# Patient Record
Sex: FEMALE | Race: ASIAN | Employment: FULL TIME | URBAN - NONMETROPOLITAN AREA
[De-identification: names, ages, dates, MRNs, and addresses within clinical notes are randomized per-mention and may not be internally consistent; named-entity substitution may affect disease eponyms.]

---

## 2024-04-10 ENCOUNTER — OFFICE VISIT (OUTPATIENT)
Dept: URGENT CARE | Facility: CLINIC | Age: 29
End: 2024-04-10
Payer: COMMERCIAL

## 2024-04-10 VITALS
SYSTOLIC BLOOD PRESSURE: 107 MMHG | OXYGEN SATURATION: 100 % | TEMPERATURE: 98.4 F | RESPIRATION RATE: 18 BRPM | DIASTOLIC BLOOD PRESSURE: 73 MMHG | HEART RATE: 78 BPM

## 2024-04-10 DIAGNOSIS — J06.9 VIRAL URI WITH COUGH: Primary | ICD-10-CM

## 2024-04-10 PROCEDURE — 99203 OFFICE O/P NEW LOW 30 MIN: CPT | Performed by: STUDENT IN AN ORGANIZED HEALTH CARE EDUCATION/TRAINING PROGRAM

## 2024-04-10 RX ORDER — NORETHINDRONE ACETATE AND ETHINYL ESTRADIOL AND FERROUS FUMARATE 1MG-20(21)
KIT ORAL
COMMUNITY

## 2024-04-10 RX ORDER — FLUTICASONE PROPIONATE 50 MCG
1 SPRAY, SUSPENSION (ML) NASAL 2 TIMES DAILY
Qty: 16 G | Refills: 0 | Status: SHIPPED | OUTPATIENT
Start: 2024-04-10

## 2024-04-10 NOTE — LETTER
April 10, 2024     Patient: Hayley Brown   YOB: 1995   Date of Visit: 4/10/2024       To Whom it May Concern:    Hayley Brown was seen in my clinic on 4/10/2024.     If you have any questions or concerns, please don't hesitate to call.         Sincerely,          Alexia Galo, DO        CC: No Recipients

## 2024-04-10 NOTE — PROGRESS NOTES
[facility]        NAME: Hayley Brown is a 29 y.o. female  : 1995    MRN: [id number]    Assessment and Plan   Viral URI with cough [J06.9]  1. Viral URI with cough  fluticasone (FLONASE) 50 mcg/act nasal spray        Low suspicion for bacterial etiology of presenting symptoms.  Discussed symptomatic and supportive measures for management.  Provided work note.    Patient Instructions     See wrap up for details  Follow up with PCP in 3-5 days.  Proceed to  ER if symptoms worsen.    Chief Complaint     Chief Complaint   Patient presents with    Cough    Sore Throat     Sore throat with cough onset 2 days ago  denies fever or chills         History of Present Illness     HPI    Onset of symptoms 2 days ago  Reports sore throat, nonproductive cough, mild pain with swallowing, nausea, myalgias  Denies fever, chills, congestion, rhinorrhea, diarrhea, vomiting, fatigue, decreased appetite   Has not tried any otc     Review of Systems   Review of Systems   Constitutional:  Negative for chills and fever.   HENT:  Positive for sore throat and trouble swallowing. Negative for ear pain.    Eyes:  Negative for pain and visual disturbance.   Respiratory:  Positive for cough. Negative for chest tightness and shortness of breath.    Cardiovascular:  Negative for chest pain and palpitations.   Gastrointestinal:  Positive for nausea. Negative for abdominal pain, constipation, diarrhea and vomiting.   Genitourinary:  Negative for dysuria, hematuria and menstrual problem.   Musculoskeletal:  Positive for myalgias. Negative for arthralgias and back pain.   Skin:  Negative for color change and rash.   Neurological:  Negative for seizures and syncope.   Psychiatric/Behavioral:  Negative for dysphoric mood and suicidal ideas.    All other systems reviewed and are negative.    Current Medications       Current Outpatient Medications:     fluticasone (FLONASE) 50 mcg/act nasal spray, 1 spray into each nostril 2 (two)  times a day, Disp: 16 g, Rfl: 0    Aurovela FE 1/20 1-20 MG-MCG per tablet, TOME 1 TABLETA POR V A ORAL TODOS LOS D AS, Disp: , Rfl:     Current Allergies     Allergies as of 04/10/2024    (No Known Allergies)            The following portions of the patient's history were reviewed and updated as appropriate: allergies, current medications, past family history, past medical history, past social history, past surgical history and problem list.     History reviewed. No pertinent past medical history.    History reviewed. No pertinent surgical history.    No family history on file.      Medications have been verified.        Objective   /73   Pulse 78   Temp 98.4 °F (36.9 °C)   Resp 18   SpO2 100%        Physical Exam     Physical Exam  Constitutional:       General: She is not in acute distress.     Appearance: Normal appearance.   HENT:      Head: Normocephalic and atraumatic.      Right Ear: A middle ear effusion is present. Tympanic membrane is not erythematous.      Left Ear: A middle ear effusion is present. Tympanic membrane is not erythematous.      Nose: Congestion present.      Mouth/Throat:      Mouth: Mucous membranes are moist.      Pharynx: Oropharynx is clear. No oropharyngeal exudate or posterior oropharyngeal erythema.      Tonsils: No tonsillar exudate or tonsillar abscesses. 1+ on the right. 1+ on the left.   Eyes:      Extraocular Movements: Extraocular movements intact.      Conjunctiva/sclera: Conjunctivae normal.   Cardiovascular:      Rate and Rhythm: Normal rate and regular rhythm.   Pulmonary:      Effort: Pulmonary effort is normal. No respiratory distress.      Breath sounds: Normal breath sounds.   Lymphadenopathy:      Cervical: No cervical adenopathy.   Skin:     General: Skin is warm and dry.   Neurological:      General: No focal deficit present.      Mental Status: She is alert and oriented to person, place, and time.   Psychiatric:         Mood and Affect: Mood normal.          Behavior: Behavior normal.

## 2024-07-27 ENCOUNTER — HOSPITAL ENCOUNTER (EMERGENCY)
Facility: HOSPITAL | Age: 29
Discharge: HOME | End: 2024-07-27
Attending: EMERGENCY MEDICINE
Payer: COMMERCIAL

## 2024-07-27 ENCOUNTER — APPOINTMENT (EMERGENCY)
Dept: RADIOLOGY | Facility: HOSPITAL | Age: 29
End: 2024-07-27
Payer: COMMERCIAL

## 2024-07-27 VITALS
HEIGHT: 62 IN | HEART RATE: 69 BPM | TEMPERATURE: 98.9 F | WEIGHT: 118 LBS | DIASTOLIC BLOOD PRESSURE: 77 MMHG | OXYGEN SATURATION: 99 % | RESPIRATION RATE: 18 BRPM | BODY MASS INDEX: 21.71 KG/M2 | SYSTOLIC BLOOD PRESSURE: 117 MMHG

## 2024-07-27 DIAGNOSIS — S93.401A MODERATE ANKLE SPRAIN, RIGHT, INITIAL ENCOUNTER: Primary | ICD-10-CM

## 2024-07-27 PROCEDURE — 73610 X-RAY EXAM OF ANKLE: CPT

## 2024-07-27 PROCEDURE — 99284 EMERGENCY DEPT VISIT MOD MDM: CPT | Performed by: EMERGENCY MEDICINE

## 2024-07-27 PROCEDURE — 73630 X-RAY EXAM OF FOOT: CPT

## 2024-07-27 PROCEDURE — 99283 EMERGENCY DEPT VISIT LOW MDM: CPT

## 2024-07-27 RX ORDER — IBUPROFEN 400 MG/1
400 TABLET ORAL ONCE
Status: COMPLETED | OUTPATIENT
Start: 2024-07-27 | End: 2024-07-27

## 2024-07-27 RX ADMIN — IBUPROFEN 400 MG: 400 TABLET ORAL at 07:42

## 2024-07-27 NOTE — Clinical Note
Hayley Brown was seen and treated in our emergency department on 7/27/2024.                Diagnosis:     Hayley  may return to school on return date.    She may return on this date: 07/31/2024    Ms. Brown was seen in the [facility] emergency department on 27 July 2024.  Due to injury, she should abstain from work on 30 July but can return on 31 July.  Thank you.     If you have any questions or concerns, please don't hesitate to call.      Jonathan Burnham, DO    ______________________________           _______________          _______________  Hospital Representative                              Date                                Time

## 2024-07-27 NOTE — ED PROVIDER NOTES
History  Chief Complaint   Patient presents with    Ankle Injury     Patient twisted her R ankle yesterday.      29-year-old woman presents to the emergency department with injury to the right ankle occurring yesterday evening when she inverted it while descending a flight of stairs.  States that she was not looking exactly where she was stepping, causing her to step awkwardly and invert the right ankle.  Had immediate pain along the medial/lateral aspects of the right ankle that is now worse with active range of motion and weightbearing.  No pain more proximally along the leg or at the knee.  Also notes some pain through the midportion of the foot which is particularly worse with weightbearing. Indeed she has not been able to perform more than minimal weightbearing since the injury secondary to severe pain.  No other trauma or injury from the episode.  No prior fracture or surgery in right lower extremity.  Did not take or use anything for pain this morning.    A/P: Unable to fully weight-bear since the injury with reproducible medial/lateral ankle tenderness.  Imaging of the ankle indicated per [place] ankle rules.  Concurrent tenderness through the foot also warrants imaging of foot given inability to fully evaluate with lack of complete weightbearing.  No evidence of neurovascular injury.  Symptomatic management while workup ongoing.      History provided by:  Medical records, patient and relative   used: No (Patient's relative provides interpretation for her)    Ankle Injury      Prior to Admission Medications   Prescriptions Last Dose Informant Patient Reported? Taking?   Aurovela FE  1-20 MG-MCG per tablet   Yes No   Sig: TOME 1 TABLETA POR V A ORAL TODOS LOS D AS   fluticasone (FLONASE) 50 mcg/act nasal spray   No No   Si spray into each nostril 2 (two) times a day      Facility-Administered Medications: None       History reviewed. No pertinent past medical history.    History  reviewed. No pertinent surgical history.    History reviewed. No pertinent family history.  I have reviewed and agree with the history as documented.    E-Cigarette/Vaping     E-Cigarette/Vaping Substances     Social History     Tobacco Use    Smoking status: Never    Smokeless tobacco: Never       Review of Systems   Musculoskeletal:  Positive for arthralgias, gait problem (Unable to bear weight on RLE 2/2 pain) and joint swelling.   Skin: Negative.  Negative for color change and wound.   Neurological:  Negative for weakness and numbness.       Physical Exam  Physical Exam  Vitals and nursing note reviewed.   Constitutional:       General: She is not in acute distress.     Appearance: Normal appearance. She is not ill-appearing.   HENT:      Head: Normocephalic and atraumatic.   Neck:      Trachea: Trachea and phonation normal.   Cardiovascular:      Rate and Rhythm: Normal rate and regular rhythm.      Pulses: Normal pulses.           Popliteal pulses are 2+ on the right side and 2+ on the left side.        Dorsalis pedis pulses are 2+ on the right side and 2+ on the left side.        Posterior tibial pulses are 2+ on the right side and 2+ on the left side.   Pulmonary:      Effort: Pulmonary effort is normal. No tachypnea, accessory muscle usage or respiratory distress.   Musculoskeletal:      Right knee: Normal.      Left knee: Normal.      Right lower leg: Normal.      Left lower leg: Normal.      Right ankle: Swelling (mild swelling over lateral ankle) present. Tenderness present over the lateral malleolus, medial malleolus and ATF ligament. No posterior TF ligament, base of 5th metatarsal or proximal fibula tenderness. Decreased range of motion.      Left ankle: Normal.      Right foot: Normal range of motion. Tenderness (tenderness over mid-metatarsals; no tenderness of Lisfranc complex) and bony tenderness present. No swelling, deformity or crepitus.      Left foot: Normal.      Comments: RLE: Mild soft  tissue swelling immediately inferior/anterior to the lateral malleolus with no palpable bony deformity and no overlying skin breakdown.  TTP over bilateral malleoli and regions immediately inferior/anterior of both.  Remainder of tibia and fibular nontender including fibular head.  No ttp of proximal 5th MT.  Full AROM in flexion/extension/inversion/eversion at ankle.  Strength 5/5 in LE bilaterally at knee/ankle/toes in flexion/extension.       Skin:     General: Skin is warm and dry.      Capillary Refill: Capillary refill takes less than 2 seconds. <2s in toes of R foot  Neurological:      Mental Status: She is alert and oriented to person, place, and time.      GCS: GCS eye subscore is 4. GCS verbal subscore is 5. GCS motor subscore is 6.      Sensory: Sensation is intact.      Motor: Motor function is intact.      Comments: Intact sensation to light touch in bilateral lower extremity distal to the knee.  Strength 5/5 bilateral lower extremity at knee and ankle in all planes of motion able to wiggle toes of right foot..         Vital Signs  ED Triage Vitals [07/27/24 0730]   Temperature Pulse Respirations Blood Pressure SpO2   98.9 °F (37.2 °C) 69 18 117/77 99 %      Temp Source Heart Rate Source Patient Position - Orthostatic VS BP Location FiO2 (%)   Temporal Monitor Sitting Left arm --      Pain Score       6           Vitals:    07/27/24 0730   BP: 117/77   Pulse: 69   Patient Position - Orthostatic VS: Sitting         Visual Acuity      ED Medications  Medications   ibuprofen (MOTRIN) tablet 400 mg (400 mg Oral Given 7/27/24 0742)       Diagnostic Studies  Results Reviewed       None                   XR foot 3+ views RIGHT   ED Interpretation by Jonathan Burnham DO (07/27 0757)   No fracture or dislocation.  Joint spaces appear normal.      XR ankle 3+ views RIGHT   ED Interpretation by Jonathan Burnham DO (07/27 0757)   No fracture or dislocation.  Joint spaces appear normal.                  Procedures  Procedures         ED Course  ED Course as of 07/27/24 0822   Sat Jul 27, 2024   0757 No evidence of fracture or dislocation on plain film images.  As noted previously, no neurovascular impairment.  No gross ligamentous instability on examination.  Will apply immobilizing boot and give crutches with weightbearing as tolerated.  Acetaminophen/ibuprofen as needed for pain control. Would need reexamination by primary physician or at least other medical professional in the near future (about 2 wk) to assure appropriate healing given that she is not able to bear weight at present  All questions answered to satisfaction of patient and her family prior to discharge.  They expressed understanding and agreed to plan           SBIRT 22yo+      Flowsheet Row Most Recent Value   Initial Alcohol Screen: US AUDIT-C     1. How often do you have a drink containing alcohol? 0 Filed at: 07/27/2024 0730   2. How many drinks containing alcohol do you have on a typical day you are drinking?  0 Filed at: 07/27/2024 0730   3a. Male UNDER 65: How often do you have five or more drinks on one occasion? 0 Filed at: 07/27/2024 0730   3b. FEMALE Any Age, or MALE 65+: How often do you have 4 or more drinks on one occassion? 0 Filed at: 07/27/2024 0730   Audit-C Score 0 Filed at: 07/27/2024 0730   ALEX: How many times in the past year have you...    Used an illegal drug or used a prescription medication for non-medical reasons? Never Filed at: 07/27/2024 0730            Medical Decision Making  Amount and/or Complexity of Data Reviewed  Radiology: ordered and independent interpretation performed.    Risk  Prescription drug management.                 Disposition  Final diagnoses:   Moderate ankle sprain, right, initial encounter     Time reflects when diagnosis was documented in both MDM as applicable and the Disposition within this note       Time User Action Codes Description Comment    7/27/2024  7:59 AM Jonathan Burnham  Add [S93.401A] Moderate ankle sprain, right, initial encounter           ED Disposition       ED Disposition   Discharge    Condition   Stable    Date/Time   Sat Jul 27, 2024 0819    Comment   Hayley Brown discharge to home/self care.                   Follow-up Information    None         Patient's Medications   Discharge Prescriptions    No medications on file       No discharge procedures on file.    PDMP Review       None            ED Provider  Electronically Signed by             Jonathan Burnham DO  07/27/24 0905

## 2024-07-27 NOTE — DISCHARGE INSTRUCTIONS
You can wear the boot whenever you are up and moving around. You can put as much weight on your ankle as you can tolerate.    You can use the crutches as needed for the next week.    Continue to use ibuprofen and/or acetaminophen as needed for pain.    You should make an appointment with your regular doctor in about 2 weeks to have your ankle rechecked.    Puede usar la bota siempre que esté de pie y en movimiento. Puede poner todo el peso que pueda tolerar sobre el tobillo.    Puede usar las muletas según sea necesario joshua la próxima semana.    Continúe usando ibuprofeno o acetaminofeno según sea necesario para el dolor.    Debe programar luther beckie con ryan médico habitual en aproximadamente 2 semanas para que le vuelvan a controlar el tobillo.

## 2025-02-16 ENCOUNTER — HOSPITAL ENCOUNTER (EMERGENCY)
Facility: HOSPITAL | Age: 30
Discharge: HOME | End: 2025-02-16
Attending: EMERGENCY MEDICINE | Admitting: EMERGENCY MEDICINE
Payer: COMMERCIAL

## 2025-02-16 VITALS
SYSTOLIC BLOOD PRESSURE: 98 MMHG | DIASTOLIC BLOOD PRESSURE: 55 MMHG | RESPIRATION RATE: 16 BRPM | OXYGEN SATURATION: 97 % | HEART RATE: 78 BPM | TEMPERATURE: 98.7 F

## 2025-02-16 DIAGNOSIS — R11.2 NAUSEA AND VOMITING: Primary | ICD-10-CM

## 2025-02-16 DIAGNOSIS — Z34.90 PREGNANCY: ICD-10-CM

## 2025-02-16 LAB
ALBUMIN SERPL BCG-MCNC: 3.8 G/DL (ref 3.5–5)
ALP SERPL-CCNC: 30 U/L (ref 34–104)
ALT SERPL W P-5'-P-CCNC: 9 U/L (ref 7–52)
ANION GAP SERPL CALCULATED.3IONS-SCNC: 8 MMOL/L (ref 4–13)
AST SERPL W P-5'-P-CCNC: 19 U/L (ref 13–39)
BACTERIA UR QL AUTO: ABNORMAL /HPF
BASOPHILS # BLD AUTO: 0.04 THOUSANDS/ΜL (ref 0–0.1)
BASOPHILS NFR BLD AUTO: 0 % (ref 0–1)
BILIRUB SERPL-MCNC: 0.43 MG/DL (ref 0.2–1)
BILIRUB UR QL STRIP: NEGATIVE
BUN SERPL-MCNC: 6 MG/DL (ref 5–25)
CALCIUM SERPL-MCNC: 8.6 MG/DL (ref 8.4–10.2)
CHLORIDE SERPL-SCNC: 108 MMOL/L (ref 96–108)
CLARITY UR: CLEAR
CO2 SERPL-SCNC: 20 MMOL/L (ref 21–32)
COLOR UR: YELLOW
CREAT SERPL-MCNC: 0.46 MG/DL (ref 0.6–1.3)
EOSINOPHIL # BLD AUTO: 0.77 THOUSAND/ΜL (ref 0–0.61)
EOSINOPHIL NFR BLD AUTO: 8 % (ref 0–6)
ERYTHROCYTE [DISTWIDTH] IN BLOOD BY AUTOMATED COUNT: 12.1 % (ref 11.6–15.1)
GFR SERPL CREATININE-BSD FRML MDRD: 134 ML/MIN/1.73SQ M
GLUCOSE SERPL-MCNC: 114 MG/DL (ref 65–140)
GLUCOSE UR STRIP-MCNC: NEGATIVE MG/DL
HCT VFR BLD AUTO: 42.5 % (ref 34.8–46.1)
HGB BLD-MCNC: 13.5 G/DL (ref 11.5–15.4)
HGB UR QL STRIP.AUTO: ABNORMAL
IMM GRANULOCYTES # BLD AUTO: 0.03 THOUSAND/UL (ref 0–0.2)
IMM GRANULOCYTES NFR BLD AUTO: 0 % (ref 0–2)
KETONES UR STRIP-MCNC: ABNORMAL MG/DL
LEUKOCYTE ESTERASE UR QL STRIP: NEGATIVE
LIPASE SERPL-CCNC: 39 U/L (ref 11–82)
LYMPHOCYTES # BLD AUTO: 2.8 THOUSANDS/ΜL (ref 0.6–4.47)
LYMPHOCYTES NFR BLD AUTO: 29 % (ref 14–44)
MCH RBC QN AUTO: 30.2 PG (ref 26.8–34.3)
MCHC RBC AUTO-ENTMCNC: 31.8 G/DL (ref 31.4–37.4)
MCV RBC AUTO: 95 FL (ref 82–98)
MONOCYTES # BLD AUTO: 0.63 THOUSAND/ΜL (ref 0.17–1.22)
MONOCYTES NFR BLD AUTO: 7 % (ref 4–12)
MUCOUS THREADS UR QL AUTO: ABNORMAL
NEUTROPHILS # BLD AUTO: 5.28 THOUSANDS/ΜL (ref 1.85–7.62)
NEUTS SEG NFR BLD AUTO: 56 % (ref 43–75)
NITRITE UR QL STRIP: NEGATIVE
NON-SQ EPI CELLS URNS QL MICRO: ABNORMAL /HPF
NRBC BLD AUTO-RTO: 0 /100 WBCS
PH UR STRIP.AUTO: 6 [PH]
PLATELET # BLD AUTO: 205 THOUSANDS/UL (ref 149–390)
PMV BLD AUTO: 10.8 FL (ref 8.9–12.7)
POTASSIUM SERPL-SCNC: 4 MMOL/L (ref 3.5–5.3)
PROT SERPL-MCNC: 6.6 G/DL (ref 6.4–8.4)
PROT UR STRIP-MCNC: ABNORMAL MG/DL
RBC # BLD AUTO: 4.47 MILLION/UL (ref 3.81–5.12)
RBC #/AREA URNS AUTO: ABNORMAL /HPF
SODIUM SERPL-SCNC: 136 MMOL/L (ref 135–147)
SP GR UR STRIP.AUTO: >=1.03
UROBILINOGEN UR QL STRIP.AUTO: 0.2 E.U./DL
WBC # BLD AUTO: 9.55 THOUSAND/UL (ref 4.31–10.16)
WBC #/AREA URNS AUTO: ABNORMAL /HPF

## 2025-02-16 PROCEDURE — 85025 COMPLETE CBC W/AUTO DIFF WBC: CPT

## 2025-02-16 PROCEDURE — 87086 URINE CULTURE/COLONY COUNT: CPT

## 2025-02-16 PROCEDURE — 96365 THER/PROPH/DIAG IV INF INIT: CPT

## 2025-02-16 PROCEDURE — 96375 TX/PRO/DX INJ NEW DRUG ADDON: CPT

## 2025-02-16 PROCEDURE — 80053 COMPREHEN METABOLIC PANEL: CPT

## 2025-02-16 PROCEDURE — 96366 THER/PROPH/DIAG IV INF ADDON: CPT

## 2025-02-16 PROCEDURE — 81001 URINALYSIS AUTO W/SCOPE: CPT

## 2025-02-16 PROCEDURE — 99284 EMERGENCY DEPT VISIT MOD MDM: CPT | Performed by: EMERGENCY MEDICINE

## 2025-02-16 PROCEDURE — 99283 EMERGENCY DEPT VISIT LOW MDM: CPT

## 2025-02-16 PROCEDURE — 36415 COLL VENOUS BLD VENIPUNCTURE: CPT

## 2025-02-16 PROCEDURE — 83690 ASSAY OF LIPASE: CPT

## 2025-02-16 PROCEDURE — 76815 OB US LIMITED FETUS(S): CPT | Performed by: EMERGENCY MEDICINE

## 2025-02-16 RX ORDER — METOCLOPRAMIDE HYDROCHLORIDE 5 MG/ML
10 INJECTION INTRAMUSCULAR; INTRAVENOUS ONCE
Status: COMPLETED | OUTPATIENT
Start: 2025-02-16 | End: 2025-02-16

## 2025-02-16 RX ORDER — PYRIDOXINE HYDROCHLORIDE 100 MG/ML
50 INJECTION, SOLUTION INTRAMUSCULAR; INTRAVENOUS ONCE
Status: DISCONTINUED | OUTPATIENT
Start: 2025-02-16 | End: 2025-02-16

## 2025-02-16 RX ORDER — ONDANSETRON 4 MG/1
4 TABLET, FILM COATED ORAL EVERY 6 HOURS
Qty: 12 TABLET | Refills: 0 | Status: SHIPPED | OUTPATIENT
Start: 2025-02-16

## 2025-02-16 RX ORDER — PYRIDOXINE HYDROCHLORIDE 100 MG/ML
25 INJECTION, SOLUTION INTRAMUSCULAR; INTRAVENOUS ONCE
Status: COMPLETED | OUTPATIENT
Start: 2025-02-16 | End: 2025-02-16

## 2025-02-16 RX ADMIN — DEXTROSE AND SODIUM CHLORIDE 1000 ML: 5; .9 INJECTION, SOLUTION INTRAVENOUS at 18:17

## 2025-02-16 RX ADMIN — METOCLOPRAMIDE 10 MG: 5 INJECTION, SOLUTION INTRAMUSCULAR; INTRAVENOUS at 18:19

## 2025-02-16 RX ADMIN — PYRIDOXINE HYDROCHLORIDE 25 MG: 100 INJECTION, SOLUTION INTRAMUSCULAR; INTRAVENOUS at 19:19

## 2025-02-16 NOTE — ED PROVIDER NOTES
Time reflects when diagnosis was documented in both MDM as applicable and the Disposition within this note       Time User Action Codes Description Comment    2/16/2025  7:39 PM Sony Barrientos [R11.2] Nausea and vomiting     2/16/2025  7:39 PM Sony Barrientos [Z34.90] Pregnancy           ED Disposition       ED Disposition   Discharge    Condition   Stable    Date/Time   Sun Feb 16, 2025  8:03 PM    Comment   Hayley Brown discharge to home/self care.                   Assessment & Plan       Medical Decision Making      DDx: Rule out hyperemesis gravidarum, gastroenteritis, bowel disease, pancreatitis, low concern for peptic ulcer disease    Plan: CMP CBC LIPASE UA POC US IVF ANTIEMETICS     See ED course for further discussion    Labs are overall concerning for mild ketosis.  No weight loss.  Symptoms for overall well-controlled in the emergency department with patient tolerating p.o.  Received D5 NS.  Fetal heart tones within normal limits.  Stable for discharge home.  Provided Zofran follow-up with OB/GYN.    Disposition: Discharged with instructions to obtain outpatient follow up of patient's symptoms and findings, with strict return precautions if patient develops new or worsening symptoms. Patient understands this plan and is agreeable. All questions answered. Patient discharged home with return precautions.          Amount and/or Complexity of Data Reviewed  Labs: ordered. Decision-making details documented in ED Course.    Risk  Prescription drug management.        ED Course as of 02/16/25 2018   Sun Feb 16, 2025   1727 Fetal US 2/4/25: A normal gestational sac was documented. A normal fetal pole was   visualized. Cardiac motion was observed at 167 bpm. The yolk sac was   seen, measuring 0.32 cm. The amnion was also documented.       GENERAL COMMENT     Seen for follow-up ultrasound for dating and viability. LMP unknown   or uncertain. Comparison is made to the prior study performed on    25.     -A single intrauterine gestational sac containing a fetal pole with   cardiac activity and a yolk sac is seen. The fetal size is consistent   with 8w 4d.   -No uterine abnormalities are identified.   -A right ovarian cyst with peripheral vascularity is noted as   measured above, likely a corpus luteum. The left ovary appears normal.     **The pregnancy is dated by today's biometry.  This should be   confirmed at the time of the patient's next scheduled ultrasound.     Images were reviewed by telemedicine.     I certify that I personally reviewed this study and agree with the   report.     Kj Bass M.D.   Electronically Signed 25 11:02      1727 29 year old,  5, para 4    1858 Ketones, UA(!): 40 (2+)   1858 Blood, UA(!): 2+   1858 SL AMB SPECIFIC GRAVITY_URINE(!): >=1.030   185 Carbon Dioxide(!): 20   8 Creatinine(!): 0.46   1858 ALK PHOS(!): 30    Potassium: 4.0    Sodium: 136   0 MUCUS THREADS(!): Occasional    RBC Urine(!): 30-50   1943 Reevaluated at this time.  Feeling much improved.  Stable for discharge home.       Medications   dextrose 5 % and sodium chloride 0.9 % bolus 1,000 mL (1,000 mL Intravenous New Bag 25)   metoclopramide (REGLAN) injection 10 mg (10 mg Intravenous Given 25)   pyridoxine (VITAMIN B6) injection 25 mg (25 mg Intravenous Given 25)       ED Risk Strat Scores                                                History of Present Illness       Chief Complaint   Patient presents with    Vomiting     10 weeks pregnant,  Abdominal pain x several days       History reviewed. No pertinent past medical history.   History reviewed. No pertinent surgical history.   History reviewed. No pertinent family history.   Social History     Tobacco Use    Smoking status: Never    Smokeless tobacco: Never      E-Cigarette/Vaping      E-Cigarette/Vaping Substances      I have reviewed and agree with the history as documented.      29-year-old female, G5, P4, approximately 10 weeks pregnant, presents to the emergency department with nausea, vomiting for the last day.  She denies any vaginal bleeding or vaginal discharge.  She has had previous ultrasound findings single intrauterine pregnancy.  She denies any recent sick contacts however on chart review patient was diagnosed with influenza a few weeks prior.  He is able to tolerate p.o. complaining of nausea and vomiting.        Review of Systems   Gastrointestinal:  Positive for abdominal pain, nausea and vomiting.   All other systems reviewed and are negative.          Objective       ED Triage Vitals   Temperature Pulse Blood Pressure Respirations SpO2 Patient Position - Orthostatic VS   02/16/25 1722 02/16/25 1723 02/16/25 1723 02/16/25 1722 02/16/25 1722 02/16/25 1722   98 °F (36.7 °C) 86 113/68 20 98 % Sitting      Temp Source Heart Rate Source BP Location FiO2 (%) Pain Score    02/16/25 1722 02/16/25 1722 02/16/25 1722 -- 02/16/25 1722    Tympanic Monitor Left arm  No Pain      Vitals      Date and Time Temp Pulse SpO2 Resp BP Pain Score FACES Pain Rating User   02/16/25 1830 -- 80 100 % -- 100/66 -- -- AP   02/16/25 1824 -- 76 98 % -- 96/60 -- -- AP   02/16/25 1723 -- 86 -- -- 113/68 -- -- NAD   02/16/25 1722 98 °F (36.7 °C) -- 98 % 20 -- No Pain -- NAD            Physical Exam  Vitals and nursing note reviewed.   Constitutional:       General: She is not in acute distress.     Appearance: She is well-developed.   HENT:      Head: Normocephalic and atraumatic.   Eyes:      Conjunctiva/sclera: Conjunctivae normal.   Cardiovascular:      Rate and Rhythm: Normal rate and regular rhythm.      Heart sounds: No murmur heard.  Pulmonary:      Effort: Pulmonary effort is normal. No respiratory distress.      Breath sounds: Normal breath sounds.   Abdominal:      Palpations: Abdomen is soft.      Tenderness: There is abdominal tenderness.       Musculoskeletal:         General: No swelling.       Cervical back: Neck supple.   Skin:     General: Skin is warm and dry.      Capillary Refill: Capillary refill takes less than 2 seconds.   Neurological:      Mental Status: She is alert.   Psychiatric:         Mood and Affect: Mood normal.         Results Reviewed       Procedure Component Value Units Date/Time    Urine Microscopic [[id number]]  (Abnormal) Collected: 02/16/25 1818    Lab Status: Final result Specimen: Urine, Clean Catch Updated: 02/16/25 1859     RBC, UA 30-50 /hpf      WBC, UA None Seen /hpf      Epithelial Cells Occasional /hpf      Bacteria, UA None Seen /hpf      MUCUS THREADS Occasional     URINE COMMENT --    Comprehensive metabolic panel [[id number]]  (Abnormal) Collected: 02/16/25 1812    Lab Status: Final result Specimen: Blood from Arm, Left Updated: 02/16/25 1842     Sodium 136 mmol/L      Potassium 4.0 mmol/L      Chloride 108 mmol/L      CO2 20 mmol/L      ANION GAP 8 mmol/L      BUN 6 mg/dL      Creatinine 0.46 mg/dL      Glucose 114 mg/dL      Calcium 8.6 mg/dL      AST 19 U/L      ALT 9 U/L      Alkaline Phosphatase 30 U/L      Total Protein 6.6 g/dL      Albumin 3.8 g/dL      Total Bilirubin 0.43 mg/dL      eGFR 134 ml/min/1.73sq m     Narrative:      Slightly Hemolyzed:Results may be affected.  National Kid[employer] Foundation guidelines for Chronic Kidney Disease (CKD):     Stage 1 with normal or high GFR (GFR > 90 mL/min/1.73 square meters)    Stage 2 Mild CKD (GFR = 60-89 mL/min/1.73 square meters)    Stage 3A Moderate CKD (GFR = 45-59 mL/min/1.73 square meters)    Stage 3B Moderate CKD (GFR = 30-44 mL/min/1.73 square meters)    Stage 4 Severe CKD (GFR = 15-29 mL/min/1.73 square meters)    Stage 5 End Stage CKD (GFR <15 mL/min/1.73 square meters)  Note: GFR calculation is accurate only with a steady state creatinine    Lipase [[id number]]  (Normal) Collected: 02/16/25 1812    Lab Status: Final result Specimen: Blood from Arm, Left Updated: 02/16/25 1842     Lipase 39 u/L      Narrative:      Slightly Hemolyzed:Results may be affected.    UA w Reflex to Microscopic w Reflex to Culture [[id number]]  (Abnormal) Collected: 02/16/25 1818    Lab Status: Final result Specimen: Urine, Clean Catch Updated: 02/16/25 1837     Color, UA Yellow     Clarity, UA Clear     Specific Gravity, UA >=1.030     pH, UA 6.0     Leukocytes, UA Negative     Nitrite, UA Negative     Protein, UA Trace mg/dl      Glucose, UA Negative mg/dl      Ketones, UA 40 (2+) mg/dl      Urobilinogen, UA 0.2 E.U./dl      Bilirubin, UA Negative     Occult Blood, UA 2+     URINE COMMENT --    Urine culture [[id number]] Collected: 02/16/25 1818    Lab Status: In process Specimen: Urine, Clean Catch Updated: 02/16/25 1837    CBC and differential [[id number]]  (Abnormal) Collected: 02/16/25 1812    Lab Status: Final result Specimen: Blood from Arm, Left Updated: 02/16/25 1824     WBC 9.55 Thousand/uL      RBC 4.47 Million/uL      Hemoglobin 13.5 g/dL      Hematocrit 42.5 %      MCV 95 fL      MCH 30.2 pg      MCHC 31.8 g/dL      RDW 12.1 %      MPV 10.8 fL      Platelets 205 Thousands/uL      nRBC 0 /100 WBCs      Segmented % 56 %      Immature Grans % 0 %      Lymphocytes % 29 %      Monocytes % 7 %      Eosinophils Relative 8 %      Basophils Relative 0 %      Absolute Neutrophils 5.28 Thousands/µL      Absolute Immature Grans 0.03 Thousand/uL      Absolute Lymphocytes 2.80 Thousands/µL      Absolute Monocytes 0.63 Thousand/µL      Eosinophils Absolute 0.77 Thousand/µL      Basophils Absolute 0.04 Thousands/µL             No orders to display       POC Pelvic US    Date/Time: 2/16/2025 5:53 PM    Performed by: Sony Barrientos DO  Authorized by: Sony Barrientos DO    Patient location:  ED  Other Assisting Provider: Yes (comment) (Dr. Mathews)    Procedure details:     Exam Type:  Diagnostic    Indications: evaluate for IUP      Assessment for: confirm intrauterine pregnancy      Technique:  Transabdominal obstetric (HCG+)  exam    Image quality: diagnostic      Image availability:  Images available in PACS  Uterine findings:     Intrauterine pregnancy: identified      Single gestation: identified      Fetal pole: identified      Fetal heart rate: identified      Fetal heart rate (bpm):  165  Other findings:     Free pelvic fluid: not identified      Free peritoneal fluid: not identified    Interpretation:     Ultrasound impressions: normal      Pregnancy findings: intrauterine pregnancy (IUP)    Comments:      Single IUP with  bpm      ED Medication and Procedure Management   Prior to Admission Medications   Prescriptions Last Dose Informant Patient Reported? Taking?   Aurovela FE  1-20 MG-MCG per tablet   Yes No   Sig: TOME 1 TABLETA POR V A ORAL TODOS LOS D AS   fluticasone (FLONASE) 50 mcg/act nasal spray   No No   Si spray into each nostril 2 (two) times a day      Facility-Administered Medications: None     Patient's Medications   Discharge Prescriptions    ONDANSETRON (ZOFRAN) 4 MG TABLET    Take 1 tablet (4 mg total) by mouth every 6 (six) hours       Start Date: 2025 End Date: --       Order Dose: 4 mg       Quantity: 12 tablet    Refills: 0       ED SEPSIS DOCUMENTATION   Time reflects when diagnosis was documented in both MDM as applicable and the Disposition within this note       Time User Action Codes Description Comment    2025  7:39 PM Sony Barrientos [R11.2] Nausea and vomiting     2025  7:39 PM Sony Barrientos [Z34.90] Pregnancy                  Sony Barrientos DO  25

## 2025-02-16 NOTE — Clinical Note
Hayley Brown was seen and treated in our emergency department on 2/16/2025.            as tolerated    Diagnosis: ER visit    Hayley  .    She may return on this date: 02/18/2025         If you have any questions or concerns, please don't hesitate to call.      Sony Barrientos, DO    ______________________________           _______________          _______________  Hospital Representative                              Date                                Time

## 2025-02-17 NOTE — ED ATTENDING ATTESTATION
2/16/2025  I, Carlos Mathews DO, saw and evaluated the patient. I have discussed the patient with the resident/non-physician practitioner and agree with the resident's/non-physician practitioner's findings, Plan of Care, and MDM as documented in the resident's/non-physician practitioner's note, except where noted. All available labs and Radiology studies were reviewed.  I was present for key portions of any procedure(s) performed by the resident/non-physician practitioner and I was immediately available to provide assistance.       At this point I agree with the current assessment done in the Emergency Department.  I have conducted an independent evaluation of this patient a history and physical is as follows:    30-year-old female presents with nausea vomiting.  She is pregnant with ultrasound concern pregnancy.  Not consistent with ectopic, threatened miscarriage etc.  Likely hyperemesis.  Patient was treated in the emergency department, felt better and was comfortable with discharge.    ED Course         Critical Care Time  Procedures

## 2025-02-17 NOTE — DISCHARGE INSTRUCTIONS
Hayley Livingstonutista was seen and evaluated today in the emergency department over your concern of nausea and vomiting.  The workup that we performed showed nausea and vomiting, mild ketones.  Please return to the emergency department if you experience return of your symptoms or any other signs and symptoms that may be concerning to you.  Please follow-up with your primary care doctor within 1 week.  All questions were answered prior to discharge.  Thank you for choosing [facility] for your care.    Follow-up with OB/GYN

## 2025-02-18 LAB — BACTERIA UR CULT: NORMAL

## 2025-03-13 ENCOUNTER — HOSPITAL ENCOUNTER (EMERGENCY)
Facility: HOSPITAL | Age: 30
Discharge: HOME | End: 2025-03-13
Attending: EMERGENCY MEDICINE
Payer: COMMERCIAL

## 2025-03-13 VITALS
OXYGEN SATURATION: 100 % | BODY MASS INDEX: 22.66 KG/M2 | WEIGHT: 123.9 LBS | SYSTOLIC BLOOD PRESSURE: 104 MMHG | HEART RATE: 103 BPM | RESPIRATION RATE: 16 BRPM | TEMPERATURE: 98.2 F | DIASTOLIC BLOOD PRESSURE: 60 MMHG

## 2025-03-13 DIAGNOSIS — O21.9 NAUSEA AND VOMITING IN PREGNANCY: Primary | ICD-10-CM

## 2025-03-13 LAB
ANION GAP SERPL CALCULATED.3IONS-SCNC: 15 MMOL/L (ref 4–13)
BACTERIA UR QL AUTO: ABNORMAL /HPF
BASOPHILS # BLD AUTO: 0.05 THOUSANDS/ÂΜL (ref 0–0.1)
BASOPHILS NFR BLD AUTO: 0 % (ref 0–1)
BILIRUB UR QL STRIP: NEGATIVE
BUN SERPL-MCNC: 8 MG/DL (ref 5–25)
CALCIUM SERPL-MCNC: 9.8 MG/DL (ref 8.4–10.2)
CHLORIDE SERPL-SCNC: 103 MMOL/L (ref 96–108)
CLARITY UR: CLEAR
CO2 SERPL-SCNC: 21 MMOL/L (ref 21–32)
COLOR UR: COLORLESS
CREAT SERPL-MCNC: 0.53 MG/DL (ref 0.6–1.3)
EOSINOPHIL # BLD AUTO: 0.39 THOUSAND/ÂΜL (ref 0–0.61)
EOSINOPHIL NFR BLD AUTO: 3 % (ref 0–6)
ERYTHROCYTE [DISTWIDTH] IN BLOOD BY AUTOMATED COUNT: 11.9 % (ref 11.6–15.1)
FLUAV AG UPPER RESP QL IA.RAPID: NEGATIVE
FLUBV AG UPPER RESP QL IA.RAPID: NEGATIVE
GFR SERPL CREATININE-BSD FRML MDRD: 127 ML/MIN/1.73SQ M
GLUCOSE SERPL-MCNC: 74 MG/DL (ref 65–140)
GLUCOSE UR STRIP-MCNC: ABNORMAL MG/DL
HCT VFR BLD AUTO: 44.6 % (ref 34.8–46.1)
HGB BLD-MCNC: 15.1 G/DL (ref 11.5–15.4)
HGB UR QL STRIP.AUTO: ABNORMAL
IMM GRANULOCYTES # BLD AUTO: 0.03 THOUSAND/UL (ref 0–0.2)
IMM GRANULOCYTES NFR BLD AUTO: 0 % (ref 0–2)
KETONES UR STRIP-MCNC: NEGATIVE MG/DL
LEUKOCYTE ESTERASE UR QL STRIP: ABNORMAL
LIPASE SERPL-CCNC: 37 U/L (ref 11–82)
LYMPHOCYTES # BLD AUTO: 2.81 THOUSANDS/ÂΜL (ref 0.6–4.47)
LYMPHOCYTES NFR BLD AUTO: 23 % (ref 14–44)
MCH RBC QN AUTO: 29.6 PG (ref 26.8–34.3)
MCHC RBC AUTO-ENTMCNC: 33.9 G/DL (ref 31.4–37.4)
MCV RBC AUTO: 88 FL (ref 82–98)
MONOCYTES # BLD AUTO: 0.59 THOUSAND/ÂΜL (ref 0.17–1.22)
MONOCYTES NFR BLD AUTO: 5 % (ref 4–12)
NEUTROPHILS # BLD AUTO: 8.63 THOUSANDS/ÂΜL (ref 1.85–7.62)
NEUTS SEG NFR BLD AUTO: 69 % (ref 43–75)
NITRITE UR QL STRIP: NEGATIVE
NON-SQ EPI CELLS URNS QL MICRO: ABNORMAL /HPF
NRBC BLD AUTO-RTO: 0 /100 WBCS
PH UR STRIP.AUTO: 7 [PH]
PLATELET # BLD AUTO: 244 THOUSANDS/UL (ref 149–390)
PMV BLD AUTO: 11 FL (ref 8.9–12.7)
POTASSIUM SERPL-SCNC: 4.2 MMOL/L (ref 3.5–5.3)
PROT UR STRIP-MCNC: NEGATIVE MG/DL
RBC # BLD AUTO: 5.1 MILLION/UL (ref 3.81–5.12)
RBC #/AREA URNS AUTO: ABNORMAL /HPF
SARS-COV+SARS-COV-2 AG RESP QL IA.RAPID: NEGATIVE
SODIUM SERPL-SCNC: 139 MMOL/L (ref 135–147)
SP GR UR STRIP.AUTO: <1.005 (ref 1–1.03)
UROBILINOGEN UR STRIP-ACNC: <2 MG/DL
WBC # BLD AUTO: 12.5 THOUSAND/UL (ref 4.31–10.16)
WBC #/AREA URNS AUTO: ABNORMAL /HPF

## 2025-03-13 PROCEDURE — 81001 URINALYSIS AUTO W/SCOPE: CPT | Performed by: EMERGENCY MEDICINE

## 2025-03-13 PROCEDURE — 85025 COMPLETE CBC W/AUTO DIFF WBC: CPT | Performed by: EMERGENCY MEDICINE

## 2025-03-13 PROCEDURE — 36415 COLL VENOUS BLD VENIPUNCTURE: CPT | Performed by: EMERGENCY MEDICINE

## 2025-03-13 PROCEDURE — 96375 TX/PRO/DX INJ NEW DRUG ADDON: CPT

## 2025-03-13 PROCEDURE — 87086 URINE CULTURE/COLONY COUNT: CPT | Performed by: EMERGENCY MEDICINE

## 2025-03-13 PROCEDURE — 99284 EMERGENCY DEPT VISIT MOD MDM: CPT

## 2025-03-13 PROCEDURE — 96366 THER/PROPH/DIAG IV INF ADDON: CPT

## 2025-03-13 PROCEDURE — 96368 THER/DIAG CONCURRENT INF: CPT

## 2025-03-13 PROCEDURE — 80048 BASIC METABOLIC PNL TOTAL CA: CPT | Performed by: EMERGENCY MEDICINE

## 2025-03-13 PROCEDURE — 83690 ASSAY OF LIPASE: CPT | Performed by: EMERGENCY MEDICINE

## 2025-03-13 PROCEDURE — 96365 THER/PROPH/DIAG IV INF INIT: CPT

## 2025-03-13 PROCEDURE — 99284 EMERGENCY DEPT VISIT MOD MDM: CPT | Performed by: EMERGENCY MEDICINE

## 2025-03-13 PROCEDURE — 87804 INFLUENZA ASSAY W/OPTIC: CPT | Performed by: EMERGENCY MEDICINE

## 2025-03-13 PROCEDURE — 87811 SARS-COV-2 COVID19 W/OPTIC: CPT | Performed by: EMERGENCY MEDICINE

## 2025-03-13 RX ORDER — SODIUM CHLORIDE, SODIUM GLUCONATE, SODIUM ACETATE, POTASSIUM CHLORIDE, MAGNESIUM CHLORIDE, SODIUM PHOSPHATE, DIBASIC, AND POTASSIUM PHOSPHATE .53; .5; .37; .037; .03; .012; .00082 G/100ML; G/100ML; G/100ML; G/100ML; G/100ML; G/100ML; G/100ML
1000 INJECTION, SOLUTION INTRAVENOUS ONCE
Status: COMPLETED | OUTPATIENT
Start: 2025-03-13 | End: 2025-03-13

## 2025-03-13 RX ORDER — PYRIDOXINE HYDROCHLORIDE 100 MG/ML
25 INJECTION, SOLUTION INTRAMUSCULAR; INTRAVENOUS ONCE
Status: COMPLETED | OUTPATIENT
Start: 2025-03-13 | End: 2025-03-13

## 2025-03-13 RX ORDER — METOCLOPRAMIDE HYDROCHLORIDE 5 MG/ML
10 INJECTION INTRAMUSCULAR; INTRAVENOUS ONCE
Status: COMPLETED | OUTPATIENT
Start: 2025-03-13 | End: 2025-03-13

## 2025-03-13 RX ADMIN — PYRIDOXINE HYDROCHLORIDE 25 MG: 100 INJECTION, SOLUTION INTRAMUSCULAR; INTRAVENOUS at 18:36

## 2025-03-13 RX ADMIN — METOCLOPRAMIDE 10 MG: 5 INJECTION, SOLUTION INTRAMUSCULAR; INTRAVENOUS at 18:34

## 2025-03-13 RX ADMIN — DEXTROSE 1000 ML: 5 SOLUTION INTRAVENOUS at 18:40

## 2025-03-13 RX ADMIN — SODIUM CHLORIDE, SODIUM GLUCONATE, SODIUM ACETATE, POTASSIUM CHLORIDE, MAGNESIUM CHLORIDE, SODIUM PHOSPHATE, DIBASIC, AND POTASSIUM PHOSPHATE 1000 ML: .53; .5; .37; .037; .03; .012; .00082 INJECTION, SOLUTION INTRAVENOUS at 18:40

## 2025-03-13 NOTE — ED PROVIDER NOTES
Time reflects when diagnosis was documented in both MDM as applicable and the Disposition within this note       Time User Action Codes Description Comment    3/13/2025  9:51 PM Arsh Bartlett [O21.9] Nausea and vomiting in pregnancy           ED Disposition       ED Disposition   Discharge    Condition   Stable    Date/Time   u Mar 13, 2025  9:51 PM    Comment   Hayley Brown discharge to home/self care.                   Assessment & Plan       Medical Decision Making  30-year-old female 13 weeks 4 days pregnant single IUP presenting for nausea vomiting which is complicated this pregnancy and prior pregnancies.  Differential diagnosis includes hyperemesis gravidarum, viral syndrome, gastroenteritis, pancreatitis, dehydration, ketoacidosis, hypoglycemia, LES, hypokalemia or other electrolyte derangements.  Will check basic labs fetal heart tones provide IV fluids and antiemetics.    Fetal heart tones obtained.  Labs reveal no significant ketonuria or anion gap acidosis.  Received dextrose containing IV fluids as well as isolate urinalysis after fluids with specific gravity less than 1.005, no evidence of UTI.  No vaginal bleeding.  Will proceed with outpatient management with OB/GYN return if worse continue Zofran as outpatient.    Problems Addressed:  Nausea and vomiting in pregnancy: acute illness or injury    Amount and/or Complexity of Data Reviewed  Labs: ordered. Decision-making details documented in ED Course.    Risk  Prescription drug management.        ED Course as of 03/13/25 2302   Thu Mar 13, 2025   1947 FHT 160s   2150 Ketones, UA: Negative   2150 SL AMB SPECIFIC GRAVITY_URINE(!): <1.005   2150 WBC, UA: 2-4   2150 Bacteria, UA: Occasional       Medications   metoclopramide (REGLAN) injection 10 mg (10 mg Intravenous Given 3/13/25 1834)   pyridoxine (VITAMIN B6) injection 25 mg (25 mg Intravenous Given 3/13/25 1836)   dextrose 5 % bolus 1,000 mL (0 mL Intravenous Stopped 3/13/25 2040)    multi-electrolyte (Plasmalyte-A/Isolyte-S PH 7.4/Normosol-R) IV bolus 1,000 mL (0 mL Intravenous Stopped 3/13/25 1940)       ED Risk Strat Scores                            SBIRT 20yo+      Flowsheet Row Most Recent Value   Initial Alcohol Screen: US AUDIT-C     3b. FEMALE Any Age, or MALE 65+: How often do you have 4 or more drinks on one occassion? 0 Filed at: 03/13/2025 6463   Audit-C Score 0 Filed at: 03/13/2025 1644                            History of Present Illness       Chief Complaint   Patient presents with    Vomiting During Pregnancy     Pt is 13 weeks pregnant and has been vomiting for the past month. Also both ears hurting       History reviewed. No pertinent past medical history.   History reviewed. No pertinent surgical history.   History reviewed. No pertinent family history.   Social History     Tobacco Use    Smoking status: Never    Smokeless tobacco: Never   Vaping Use    Vaping status: Never Used      E-Cigarette/Vaping    E-Cigarette Use Never User       E-Cigarette/Vaping Substances      I have reviewed and agree with the history as documented.     This is a 30-year-old female G5, P4 currently with hilario IUP gestation at approximately 13 weeks 4 days, with pregnancy complicated by hyperemesis, presenting with complaints of nausea vomiting poor p.o. intake for the last 24 to 36 hours.  Patient reports that she started with difficulties with nausea and vomiting and p.o. intake during this pregnancy over the last 6 weeks.  She reports a history of similar with previous pregnancies the worst of which was her third pregnancy which required central venous access for infusions during the pregnancy.  States that she taking Zofran at home without relief of symptoms.  States persistent nausea and vomiting nonbloody nonbilious is preventing her from keeping significant p.o. fluids or foods down.  Reports some discomfort she describes as heartburn associated with the vomiting.  Denies any  significant abdominal pain flank pain urinary complaints.  Denies vaginal bleeding.  No dysuria.  Denies syncope.  Endorses some dizziness lightheadedness associate with the nausea vomiting and dehydration.  Called OB who referred her to the ER.          Review of Systems   Constitutional:  Positive for fatigue. Negative for chills and fever.   HENT:  Negative for ear pain and sore throat.    Eyes:  Negative for pain and visual disturbance.   Respiratory:  Negative for cough and shortness of breath.    Cardiovascular:  Negative for chest pain and palpitations.   Gastrointestinal:  Positive for nausea and vomiting. Negative for abdominal pain.   Genitourinary:  Negative for difficulty urinating, dysuria, flank pain, hematuria, pelvic pain and vaginal bleeding.   Musculoskeletal:  Negative for arthralgias and back pain.   Skin:  Negative for color change and rash.   Neurological:  Positive for light-headedness. Negative for seizures and syncope.   All other systems reviewed and are negative.          Objective       ED Triage Vitals [03/13/25 1640]   Temperature Pulse Blood Pressure Respirations SpO2 Patient Position - Orthostatic VS   98.2 °F (36.8 °C) (!) 112 133/78 18 99 % Lying      Temp Source Heart Rate Source BP Location FiO2 (%) Pain Score    Temporal Monitor Right arm -- No Pain      Vitals      Date and Time Temp Pulse SpO2 Resp BP Pain Score FACES Pain Rating User   03/13/25 2230 -- 103 100 % 16 104/60 -- --    03/13/25 2200 -- 102 100 % 16 104/56 -- --    03/13/25 2130 -- 114 99 % 16 102/66 -- --    03/13/25 1930 -- 110 100 % 16 113/73 -- --    03/13/25 1830 -- 105 99 % 18 115/78 -- --    03/13/25 1640 98.2 °F (36.8 °C) 112 99 % 18 133/78 No Pain -- KTR            Physical Exam  Vitals and nursing note reviewed. Exam conducted with a chaperone present.   Constitutional:       General: She is not in acute distress.     Appearance: Normal appearance. She is well-developed. She is not ill-appearing,  toxic-appearing or diaphoretic.   HENT:      Head: Normocephalic and atraumatic.      Right Ear: External ear normal.      Left Ear: External ear normal.      Nose: Nose normal.      Mouth/Throat:      Mouth: Mucous membranes are moist.      Pharynx: Oropharynx is clear.   Eyes:      Conjunctiva/sclera: Conjunctivae normal.   Cardiovascular:      Rate and Rhythm: Regular rhythm. Tachycardia present.      Heart sounds: No murmur heard.  Pulmonary:      Effort: Pulmonary effort is normal. No respiratory distress.      Breath sounds: Normal breath sounds. No wheezing, rhonchi or rales.   Abdominal:      General: There is no distension.      Palpations: Abdomen is soft.      Tenderness: There is no abdominal tenderness. There is no right CVA tenderness, left CVA tenderness, guarding or rebound.   Musculoskeletal:         General: No swelling.      Cervical back: Neck supple.   Skin:     General: Skin is warm and dry.      Capillary Refill: Capillary refill takes less than 2 seconds.      Coloration: Skin is not pale.   Neurological:      General: No focal deficit present.      Mental Status: She is alert. Mental status is at baseline.   Psychiatric:         Mood and Affect: Mood normal.         Results Reviewed       Procedure Component Value Units Date/Time    Urine culture [[id number]] Collected: 03/13/25 2123    Lab Status: In process Specimen: Urine, Clean Catch Updated: 03/13/25 2146    Urinalysis with microscopic [[id number]]  (Abnormal) Collected: 03/13/25 2123    Lab Status: Final result Specimen: Urine, Clean Catch Updated: 03/13/25 2145     Color, UA Colorless     Clarity, UA Clear     Specific Gravity, UA <1.005     pH, UA 7.0     Leukocytes, UA Small     Nitrite, UA Negative     Protein, UA Negative mg/dl      Glucose, UA 1000 (1%) mg/dl      Ketones, UA Negative mg/dl      Urobilinogen, UA <2.0 mg/dl      Bilirubin, UA Negative     Occult Blood, UA Trace     RBC, UA 0-1 /hpf      WBC, UA 2-4 /hpf       Epithelial Cells Occasional /hpf      Bacteria, UA Occasional /hpf     Basic metabolic panel [[id number]]  (Abnormal) Collected: 03/13/25 1743    Lab Status: Final result Specimen: Blood from Arm, Right Updated: 03/13/25 1809     Sodium 139 mmol/L      Potassium 4.2 mmol/L      Chloride 103 mmol/L      CO2 21 mmol/L      ANION GAP 15 mmol/L      BUN 8 mg/dL      Creatinine 0.53 mg/dL      Glucose 74 mg/dL      Calcium 9.8 mg/dL      eGFR 127 ml/min/1.73sq m     Narrative:      National Kidney Disease Foundation guidelines for Chronic Kidney Disease (CKD):     Stage 1 with normal or high GFR (GFR > 90 mL/min/1.73 square meters)    Stage 2 Mild CKD (GFR = 60-89 mL/min/1.73 square meters)    Stage 3A Moderate CKD (GFR = 45-59 mL/min/1.73 square meters)    Stage 3B Moderate CKD (GFR = 30-44 mL/min/1.73 square meters)    Stage 4 Severe CKD (GFR = 15-29 mL/min/1.73 square meters)    Stage 5 End Stage CKD (GFR <15 mL/min/1.73 square meters)  Note: GFR calculation is accurate only with a steady state creatinine    FLU/COVID Rapid Antigen (30 min. TAT) - Preferred screening test in ED [[id number]]  (Normal) Collected: 03/13/25 1743    Lab Status: Final result Specimen: Nares from Nose Updated: 03/13/25 1808     SARS COV Rapid Antigen Negative     Influenza A Rapid Antigen Negative     Influenza B Rapid Antigen Negative    Narrative:      This test has been performed using the Quidel Abbie 2 FLU+SARS Antigen test under the Emergency Use Authorization (EUA). This test has been validated by the  and verified by the performing laboratory. The Abbie uses lateral flow immunofluorescent sandwich assay to detect SARS-COV, Influenza A and Influenza B Antigen.     The Quidel Abbie 2 SARS Antigen test does not differentiate between SARS-CoV and SARS-CoV-2.     Negative results are presumptive and may be confirmed with a molecular assay, if necessary, for patient management. Negative results do not rule out SARS-CoV-2 or  influenza infection and should not be used as the sole basis for treatment or patient management decisions. A negative test result may occur if the level of antigen in a sample is below the limit of detection of this test.     Positive results are indicative of the presence of viral antigens, but do not rule out bacterial infection or co-infection with other viruses.     All test results should be used as an adjunct to clinical observations and other information available to the provider.    FOR PEDIATRIC PATIENTS - copy/paste COVID Guidelines URL to browser: [email or web address]    Lipase [[id number]]  (Normal) Collected: 03/13/25 1743    Lab Status: Final result Specimen: Blood from Arm, Right Updated: 03/13/25 1806     Lipase 37 u/L     CBC and differential [[id number]]  (Abnormal) Collected: 03/13/25 1743    Lab Status: Final result Specimen: Blood from Arm, Right Updated: 03/13/25 1750     WBC 12.50 Thousand/uL      RBC 5.10 Million/uL      Hemoglobin 15.1 g/dL      Hematocrit 44.6 %      MCV 88 fL      MCH 29.6 pg      MCHC 33.9 g/dL      RDW 11.9 %      MPV 11.0 fL      Platelets 244 Thousands/uL      nRBC 0 /100 WBCs      Segmented % 69 %      Immature Grans % 0 %      Lymphocytes % 23 %      Monocytes % 5 %      Eosinophils Relative 3 %      Basophils Relative 0 %      Absolute Neutrophils 8.63 Thousands/µL      Absolute Immature Grans 0.03 Thousand/uL      Absolute Lymphocytes 2.81 Thousands/µL      Absolute Monocytes 0.59 Thousand/µL      Eosinophils Absolute 0.39 Thousand/µL      Basophils Absolute 0.05 Thousands/µL             No orders to display       Procedures    ED Medication and Procedure Management   Prior to Admission Medications   Prescriptions Last Dose Informant Patient Reported? Taking?   Aurovela FE 1/20 1-20 MG-MCG per tablet   Yes No   Sig: TOME 1 TABLETA POR V A ORAL TODOS LOS D AS   fluticasone (FLONASE) 50 mcg/act nasal spray   No No    Si spray into each nostril 2 (two) times a day   ondansetron (ZOFRAN) 4 mg tablet   No No   Sig: Take 1 tablet (4 mg total) by mouth every 6 (six) hours      Facility-Administered Medications: None     Discharge Medication List as of 3/13/2025 10:36 PM        CONTINUE these medications which have NOT CHANGED    Details   Aurovela FE  1-20 MG-MCG per tablet TOME 1 TABLETA POR V A ORAL TODOS LOS D AS, Historical Med      fluticasone (FLONASE) 50 mcg/act nasal spray 1 spray into each nostril 2 (two) times a day, Starting Wed 4/10/2024, Normal      ondansetron (ZOFRAN) 4 mg tablet Take 1 tablet (4 mg total) by mouth every 6 (six) hours, Starting Sun 2025, Normal             ED SEPSIS DOCUMENTATION   Time reflects when diagnosis was documented in both MDM as applicable and the Disposition within this note       Time User Action Codes Description Comment    3/13/2025  9:51 PM Arsh Bartlett Add [O21.9] Nausea and vomiting in pregnancy                  Arsh Bartlett,   [id number]

## 2025-03-14 ENCOUNTER — TELEPHONE (OUTPATIENT)
Age: 30
End: 2025-03-14

## 2025-03-14 NOTE — DISCHARGE INSTRUCTIONS
Thank you for visiting the Emergency Department today.    Labs reassuring.  Urinalysis demonstrates adequate hydration status no ketones in the urine which you have had in previous hospitalizations for nausea and vomiting.  Received IV fluids and IV dextrose which should help with your symptoms.  Continue Zofran.  Follow-up with OB/GYN for further evaluation.  Referral and contact information for [facility] provided.  Return if symptoms worsen or other concerns.    Normal fetal heart tones, no UTI, no severe electrolyte derangements, no kidney problems.

## 2025-03-14 NOTE — TELEPHONE ENCOUNTER
PT called in regarding wanting to transfer OB care from [facility]. She has been very sick and is not happy with her care there. She is 14 weeks pregnant and recevd a referral for us when she was in the hospital yesterday. Please follow up to schedule for OB care.

## 2025-03-15 LAB — BACTERIA UR CULT: ABNORMAL

## 2025-03-17 ENCOUNTER — HOSPITAL ENCOUNTER (OUTPATIENT)
Facility: HOSPITAL | Age: 30
Setting detail: OBSERVATION
Discharge: HOME | End: 2025-03-18
Admitting: INTERNAL MEDICINE
Payer: COMMERCIAL

## 2025-03-17 DIAGNOSIS — R11.2 NAUSEA AND VOMITING: ICD-10-CM

## 2025-03-17 DIAGNOSIS — E87.29 STARVATION KETOACIDOSIS: Primary | ICD-10-CM

## 2025-03-17 DIAGNOSIS — N30.00 ACUTE CYSTITIS WITHOUT HEMATURIA: ICD-10-CM

## 2025-03-17 DIAGNOSIS — T73.0XXA STARVATION KETOACIDOSIS: Primary | ICD-10-CM

## 2025-03-17 DIAGNOSIS — Z3A.14 14 WEEKS GESTATION OF PREGNANCY: ICD-10-CM

## 2025-03-17 DIAGNOSIS — O21.0 HYPEREMESIS GRAVIDARUM: ICD-10-CM

## 2025-03-17 DIAGNOSIS — E05.90 HYPERTHYROIDISM: ICD-10-CM

## 2025-03-17 PROBLEM — R65.10 SIRS (SYSTEMIC INFLAMMATORY RESPONSE SYNDROME) (HCC): Status: ACTIVE | Noted: 2025-03-17

## 2025-03-17 PROBLEM — E87.6 HYPOKALEMIA: Status: ACTIVE | Noted: 2025-03-17

## 2025-03-17 LAB
ALBUMIN SERPL BCG-MCNC: 4.4 G/DL (ref 3.5–5)
ALP SERPL-CCNC: 47 U/L (ref 34–104)
ALT SERPL W P-5'-P-CCNC: 53 U/L (ref 7–52)
ANION GAP SERPL CALCULATED.3IONS-SCNC: 18 MMOL/L (ref 4–13)
ANION GAP SERPL CALCULATED.3IONS-SCNC: 6 MMOL/L (ref 4–13)
AST SERPL W P-5'-P-CCNC: 31 U/L (ref 13–39)
B-OH-BUTYR SERPL-MCNC: 2.69 MMOL/L (ref 0.02–0.27)
BACTERIA UR QL AUTO: ABNORMAL /HPF
BASE EX.OXY STD BLDV CALC-SCNC: 86.4 % (ref 60–80)
BASE EXCESS BLDV CALC-SCNC: -3.2 MMOL/L
BASOPHILS # BLD AUTO: 0.04 THOUSANDS/ÂΜL (ref 0–0.1)
BASOPHILS NFR BLD AUTO: 0 % (ref 0–1)
BILIRUB SERPL-MCNC: 0.81 MG/DL (ref 0.2–1)
BILIRUB UR QL STRIP: ABNORMAL
BUN SERPL-MCNC: 6 MG/DL (ref 5–25)
BUN SERPL-MCNC: 9 MG/DL (ref 5–25)
CALCIUM SERPL-MCNC: 10 MG/DL (ref 8.4–10.2)
CALCIUM SERPL-MCNC: 8.7 MG/DL (ref 8.4–10.2)
CHLORIDE SERPL-SCNC: 106 MMOL/L (ref 96–108)
CHLORIDE SERPL-SCNC: 109 MMOL/L (ref 96–108)
CLARITY UR: ABNORMAL
CO2 SERPL-SCNC: 17 MMOL/L (ref 21–32)
CO2 SERPL-SCNC: 22 MMOL/L (ref 21–32)
COLOR UR: YELLOW
CREAT SERPL-MCNC: 0.54 MG/DL (ref 0.6–1.3)
CREAT SERPL-MCNC: 0.58 MG/DL (ref 0.6–1.3)
EOSINOPHIL # BLD AUTO: 0.21 THOUSAND/ÂΜL (ref 0–0.61)
EOSINOPHIL NFR BLD AUTO: 1 % (ref 0–6)
ERYTHROCYTE [DISTWIDTH] IN BLOOD BY AUTOMATED COUNT: 11.9 % (ref 11.6–15.1)
GFR SERPL CREATININE-BSD FRML MDRD: 124 ML/MIN/1.73SQ M
GFR SERPL CREATININE-BSD FRML MDRD: 127 ML/MIN/1.73SQ M
GLUCOSE SERPL-MCNC: 124 MG/DL (ref 65–140)
GLUCOSE SERPL-MCNC: 158 MG/DL (ref 65–140)
GLUCOSE UR STRIP-MCNC: ABNORMAL MG/DL
HCO3 BLDV-SCNC: 21.7 MMOL/L (ref 24–30)
HCT VFR BLD AUTO: 43.7 % (ref 34.8–46.1)
HGB BLD-MCNC: 14.9 G/DL (ref 11.5–15.4)
HGB UR QL STRIP.AUTO: ABNORMAL
IMM GRANULOCYTES # BLD AUTO: 0.04 THOUSAND/UL (ref 0–0.2)
IMM GRANULOCYTES NFR BLD AUTO: 0 % (ref 0–2)
KETONES UR STRIP-MCNC: ABNORMAL MG/DL
LACTATE SERPL-SCNC: 0.9 MMOL/L (ref 0.5–2)
LEUKOCYTE ESTERASE UR QL STRIP: ABNORMAL
LIPASE SERPL-CCNC: 37 U/L (ref 11–82)
LYMPHOCYTES # BLD AUTO: 2.73 THOUSANDS/ÂΜL (ref 0.6–4.47)
LYMPHOCYTES NFR BLD AUTO: 17 % (ref 14–44)
MAGNESIUM SERPL-MCNC: 1.9 MG/DL (ref 1.9–2.7)
MCH RBC QN AUTO: 29.7 PG (ref 26.8–34.3)
MCHC RBC AUTO-ENTMCNC: 34.1 G/DL (ref 31.4–37.4)
MCV RBC AUTO: 87 FL (ref 82–98)
MONOCYTES # BLD AUTO: 0.68 THOUSAND/ÂΜL (ref 0.17–1.22)
MONOCYTES NFR BLD AUTO: 4 % (ref 4–12)
MUCOUS THREADS UR QL AUTO: ABNORMAL
NASAL CANNULA: ABNORMAL
NEUTROPHILS # BLD AUTO: 11.97 THOUSANDS/ÂΜL (ref 1.85–7.62)
NEUTS SEG NFR BLD AUTO: 78 % (ref 43–75)
NITRITE UR QL STRIP: NEGATIVE
NON-SQ EPI CELLS URNS QL MICRO: ABNORMAL /HPF
NRBC BLD AUTO-RTO: 0 /100 WBCS
O2 CT BLDV-SCNC: 16.9 ML/DL
PCO2 BLDV: 38.8 MM HG (ref 42–50)
PH BLDV: 7.37 [PH] (ref 7.3–7.4)
PH UR STRIP.AUTO: 5.5 [PH]
PHOSPHATE SERPL-MCNC: 3.9 MG/DL (ref 2.7–4.5)
PLATELET # BLD AUTO: 258 THOUSANDS/UL (ref 149–390)
PMV BLD AUTO: 11.4 FL (ref 8.9–12.7)
PO2 BLDV: 56.2 MM HG (ref 35–45)
POTASSIUM SERPL-SCNC: 3.4 MMOL/L (ref 3.5–5.3)
POTASSIUM SERPL-SCNC: 3.4 MMOL/L (ref 3.5–5.3)
PROCALCITONIN SERPL-MCNC: <0.05 NG/ML
PROT SERPL-MCNC: 7.6 G/DL (ref 6.4–8.4)
PROT UR STRIP-MCNC: ABNORMAL MG/DL
RBC # BLD AUTO: 5.02 MILLION/UL (ref 3.81–5.12)
RBC #/AREA URNS AUTO: ABNORMAL /HPF
SODIUM SERPL-SCNC: 137 MMOL/L (ref 135–147)
SODIUM SERPL-SCNC: 141 MMOL/L (ref 135–147)
SP GR UR STRIP.AUTO: >=1.03 (ref 1–1.03)
T4 FREE SERPL-MCNC: 1.05 NG/DL (ref 0.61–1.12)
TSH SERPL DL<=0.05 MIU/L-ACNC: <0.01 UIU/ML (ref 0.45–4.5)
UROBILINOGEN UR STRIP-ACNC: 4 MG/DL
WBC # BLD AUTO: 15.67 THOUSAND/UL (ref 4.31–10.16)
WBC #/AREA URNS AUTO: ABNORMAL /HPF

## 2025-03-17 PROCEDURE — 84439 ASSAY OF FREE THYROXINE: CPT | Performed by: STUDENT IN AN ORGANIZED HEALTH CARE EDUCATION/TRAINING PROGRAM

## 2025-03-17 PROCEDURE — 83605 ASSAY OF LACTIC ACID: CPT | Performed by: FAMILY MEDICINE

## 2025-03-17 PROCEDURE — 87086 URINE CULTURE/COLONY COUNT: CPT | Performed by: PHYSICIAN ASSISTANT

## 2025-03-17 PROCEDURE — 80053 COMPREHEN METABOLIC PANEL: CPT

## 2025-03-17 PROCEDURE — 83690 ASSAY OF LIPASE: CPT

## 2025-03-17 PROCEDURE — 87040 BLOOD CULTURE FOR BACTERIA: CPT | Performed by: FAMILY MEDICINE

## 2025-03-17 PROCEDURE — 96375 TX/PRO/DX INJ NEW DRUG ADDON: CPT

## 2025-03-17 PROCEDURE — NC001 PR NO CHARGE: Performed by: OBSTETRICS & GYNECOLOGY

## 2025-03-17 PROCEDURE — 36415 COLL VENOUS BLD VENIPUNCTURE: CPT

## 2025-03-17 PROCEDURE — 99284 EMERGENCY DEPT VISIT MOD MDM: CPT

## 2025-03-17 PROCEDURE — 83735 ASSAY OF MAGNESIUM: CPT

## 2025-03-17 PROCEDURE — 99285 EMERGENCY DEPT VISIT HI MDM: CPT

## 2025-03-17 PROCEDURE — 93005 ELECTROCARDIOGRAM TRACING: CPT

## 2025-03-17 PROCEDURE — 96365 THER/PROPH/DIAG IV INF INIT: CPT

## 2025-03-17 PROCEDURE — 96367 TX/PROPH/DG ADDL SEQ IV INF: CPT

## 2025-03-17 PROCEDURE — 82010 KETONE BODYS QUAN: CPT

## 2025-03-17 PROCEDURE — 80048 BASIC METABOLIC PNL TOTAL CA: CPT | Performed by: STUDENT IN AN ORGANIZED HEALTH CARE EDUCATION/TRAINING PROGRAM

## 2025-03-17 PROCEDURE — 82805 BLOOD GASES W/O2 SATURATION: CPT | Performed by: PHYSICIAN ASSISTANT

## 2025-03-17 PROCEDURE — 81001 URINALYSIS AUTO W/SCOPE: CPT | Performed by: PHYSICIAN ASSISTANT

## 2025-03-17 PROCEDURE — 84100 ASSAY OF PHOSPHORUS: CPT

## 2025-03-17 PROCEDURE — 96366 THER/PROPH/DIAG IV INF ADDON: CPT

## 2025-03-17 PROCEDURE — 99222 1ST HOSP IP/OBS MODERATE 55: CPT | Performed by: FAMILY MEDICINE

## 2025-03-17 PROCEDURE — 85025 COMPLETE CBC W/AUTO DIFF WBC: CPT

## 2025-03-17 PROCEDURE — 84443 ASSAY THYROID STIM HORMONE: CPT | Performed by: PHYSICIAN ASSISTANT

## 2025-03-17 PROCEDURE — 84145 PROCALCITONIN (PCT): CPT | Performed by: PHYSICIAN ASSISTANT

## 2025-03-17 RX ORDER — PANTOPRAZOLE SODIUM 40 MG/10ML
40 INJECTION, POWDER, LYOPHILIZED, FOR SOLUTION INTRAVENOUS ONCE
Status: COMPLETED | OUTPATIENT
Start: 2025-03-17 | End: 2025-03-17

## 2025-03-17 RX ORDER — ONDANSETRON 2 MG/ML
4 INJECTION INTRAMUSCULAR; INTRAVENOUS EVERY 4 HOURS
Status: DISCONTINUED | OUTPATIENT
Start: 2025-03-17 | End: 2025-03-18 | Stop reason: HOSPADM

## 2025-03-17 RX ORDER — DIPHENHYDRAMINE HYDROCHLORIDE 50 MG/ML
25 INJECTION, SOLUTION INTRAMUSCULAR; INTRAVENOUS ONCE
Status: COMPLETED | OUTPATIENT
Start: 2025-03-17 | End: 2025-03-17

## 2025-03-17 RX ORDER — THIAMINE HYDROCHLORIDE 100 MG/ML
INJECTION, SOLUTION INTRAMUSCULAR; INTRAVENOUS
Status: COMPLETED
Start: 2025-03-17 | End: 2025-03-17

## 2025-03-17 RX ORDER — ONDANSETRON 2 MG/ML
4 INJECTION INTRAMUSCULAR; INTRAVENOUS EVERY 6 HOURS PRN
Status: DISCONTINUED | OUTPATIENT
Start: 2025-03-17 | End: 2025-03-18 | Stop reason: HOSPADM

## 2025-03-17 RX ORDER — METHYLPREDNISOLONE SODIUM SUCCINATE 40 MG/ML
16 INJECTION, POWDER, LYOPHILIZED, FOR SOLUTION INTRAMUSCULAR; INTRAVENOUS
Status: DISCONTINUED | OUTPATIENT
Start: 2025-03-17 | End: 2025-03-18 | Stop reason: HOSPADM

## 2025-03-17 RX ORDER — PANTOPRAZOLE SODIUM 40 MG/10ML
40 INJECTION, POWDER, LYOPHILIZED, FOR SOLUTION INTRAVENOUS
Status: DISCONTINUED | OUTPATIENT
Start: 2025-03-18 | End: 2025-03-18 | Stop reason: HOSPADM

## 2025-03-17 RX ORDER — ACETAMINOPHEN 325 MG/1
650 TABLET ORAL EVERY 6 HOURS PRN
Status: DISCONTINUED | OUTPATIENT
Start: 2025-03-17 | End: 2025-03-18 | Stop reason: HOSPADM

## 2025-03-17 RX ORDER — SODIUM CHLORIDE, SODIUM GLUCONATE, SODIUM ACETATE, POTASSIUM CHLORIDE, MAGNESIUM CHLORIDE, SODIUM PHOSPHATE, DIBASIC, AND POTASSIUM PHOSPHATE .53; .5; .37; .037; .03; .012; .00082 G/100ML; G/100ML; G/100ML; G/100ML; G/100ML; G/100ML; G/100ML
125 INJECTION, SOLUTION INTRAVENOUS CONTINUOUS
Status: DISCONTINUED | OUTPATIENT
Start: 2025-03-17 | End: 2025-03-17

## 2025-03-17 RX ORDER — PYRIDOXINE HCL (VITAMIN B6) 50 MG
25 TABLET ORAL
Status: DISCONTINUED | OUTPATIENT
Start: 2025-03-17 | End: 2025-03-18 | Stop reason: HOSPADM

## 2025-03-17 RX ORDER — METOCLOPRAMIDE HYDROCHLORIDE 5 MG/ML
10 INJECTION INTRAMUSCULAR; INTRAVENOUS EVERY 6 HOURS PRN
Status: DISCONTINUED | OUTPATIENT
Start: 2025-03-17 | End: 2025-03-18 | Stop reason: HOSPADM

## 2025-03-17 RX ORDER — DEXTROSE MONOHYDRATE AND SODIUM CHLORIDE 5; .9 G/100ML; G/100ML
125 INJECTION, SOLUTION INTRAVENOUS CONTINUOUS
Status: DISCONTINUED | OUTPATIENT
Start: 2025-03-17 | End: 2025-03-18 | Stop reason: HOSPADM

## 2025-03-17 RX ORDER — METHYLPREDNISOLONE 4 MG/1
16 TABLET ORAL
Status: DISCONTINUED | OUTPATIENT
Start: 2025-03-17 | End: 2025-03-18 | Stop reason: HOSPADM

## 2025-03-17 RX ORDER — METOCLOPRAMIDE HYDROCHLORIDE 5 MG/ML
10 INJECTION INTRAMUSCULAR; INTRAVENOUS ONCE
Status: COMPLETED | OUTPATIENT
Start: 2025-03-17 | End: 2025-03-17

## 2025-03-17 RX ORDER — CEFEPIME HYDROCHLORIDE 2 G/50ML
2000 INJECTION, SOLUTION INTRAVENOUS EVERY 12 HOURS
Status: DISCONTINUED | OUTPATIENT
Start: 2025-03-17 | End: 2025-03-18 | Stop reason: HOSPADM

## 2025-03-17 RX ORDER — ONDANSETRON 2 MG/ML
4 INJECTION INTRAMUSCULAR; INTRAVENOUS ONCE
Status: COMPLETED | OUTPATIENT
Start: 2025-03-17 | End: 2025-03-17

## 2025-03-17 RX ADMIN — PANTOPRAZOLE SODIUM 40 MG: 40 INJECTION, POWDER, FOR SOLUTION INTRAVENOUS at 11:14

## 2025-03-17 RX ADMIN — SODIUM CHLORIDE, SODIUM GLUCONATE, SODIUM ACETATE, POTASSIUM CHLORIDE, MAGNESIUM CHLORIDE, SODIUM PHOSPHATE, DIBASIC, AND POTASSIUM PHOSPHATE 125 ML/HR: .53; .5; .37; .037; .03; .012; .00082 INJECTION, SOLUTION INTRAVENOUS at 06:58

## 2025-03-17 RX ADMIN — METHYLPREDNISOLONE 16 MG: 4 TABLET ORAL at 11:13

## 2025-03-17 RX ADMIN — CEFEPIME HYDROCHLORIDE 2000 MG: 2 INJECTION, SOLUTION INTRAVENOUS at 21:51

## 2025-03-17 RX ADMIN — THIAMINE HYDROCHLORIDE 100 MG: 100 INJECTION, SOLUTION INTRAMUSCULAR; INTRAVENOUS at 11:13

## 2025-03-17 RX ADMIN — METOCLOPRAMIDE 10 MG: 5 INJECTION, SOLUTION INTRAMUSCULAR; INTRAVENOUS at 01:41

## 2025-03-17 RX ADMIN — METHYLPREDNISOLONE SODIUM SUCCINATE 16 MG: 40 INJECTION, POWDER, FOR SOLUTION INTRAMUSCULAR; INTRAVENOUS at 09:26

## 2025-03-17 RX ADMIN — ONDANSETRON 4 MG: 2 INJECTION INTRAMUSCULAR; INTRAVENOUS at 11:21

## 2025-03-17 RX ADMIN — METHYLPREDNISOLONE 16 MG: 4 TABLET ORAL at 16:29

## 2025-03-17 RX ADMIN — CEFEPIME HYDROCHLORIDE 2000 MG: 2 INJECTION, SOLUTION INTRAVENOUS at 09:26

## 2025-03-17 RX ADMIN — DEXTROSE, SODIUM CHLORIDE, SODIUM LACTATE, POTASSIUM CHLORIDE, AND CALCIUM CHLORIDE 1000 ML: 5; .6; .31; .03; .02 INJECTION, SOLUTION INTRAVENOUS at 01:40

## 2025-03-17 RX ADMIN — DEXTROSE AND SODIUM CHLORIDE 125 ML/HR: 5; .9 INJECTION, SOLUTION INTRAVENOUS at 09:25

## 2025-03-17 RX ADMIN — ONDANSETRON 4 MG: 2 INJECTION INTRAMUSCULAR; INTRAVENOUS at 16:42

## 2025-03-17 RX ADMIN — PANTOPRAZOLE SODIUM 40 MG: 40 INJECTION, POWDER, FOR SOLUTION INTRAVENOUS at 01:54

## 2025-03-17 RX ADMIN — DEXTROSE AND SODIUM CHLORIDE 125 ML/HR: 5; .9 INJECTION, SOLUTION INTRAVENOUS at 19:25

## 2025-03-17 RX ADMIN — ONDANSETRON 4 MG: 2 INJECTION INTRAMUSCULAR; INTRAVENOUS at 21:52

## 2025-03-17 RX ADMIN — Medication 25 MG: at 21:52

## 2025-03-17 RX ADMIN — THIAMINE HYDROCHLORIDE 100 MG: 100 INJECTION, SOLUTION INTRAMUSCULAR; INTRAVENOUS at 03:58

## 2025-03-17 RX ADMIN — DIPHENHYDRAMINE HYDROCHLORIDE 25 MG: 50 INJECTION, SOLUTION INTRAMUSCULAR; INTRAVENOUS at 01:53

## 2025-03-17 RX ADMIN — ONDANSETRON 4 MG: 2 INJECTION INTRAMUSCULAR; INTRAVENOUS at 05:18

## 2025-03-17 NOTE — ASSESSMENT & PLAN NOTE
Patient with obstetric history G5, P4   Patient is currently 14-week EGA  Presented to the ER for evaluation of nausea and vomiting  Patient denies abdominal pain vaginal discharge/bleeding  Fetal heart tone in the 170s in the ER  --Continue management of nausea and vomiting  --Monitor fetal heart tones  --OB/GYN consult  --Supportive care

## 2025-03-17 NOTE — CASE MANAGEMENT
Case Management Progress Note    Patient name Hayley Brown  Location / MRN [id number]  : 1995 Date 3/17/2025       LOS (days): 0  Geometric Mean LOS (GMLOS) (days):   Days to GMLOS:        OBJECTIVE:        Current admission status: Observation  Preferred Pharmacy:   [facility]/pharm[facility] #[id number] - [place] [address] [address]  Phone: [phone] Fax: [phone]    Primary Care Provider: No primary care provider on file.    Primary Insurance: [email or web address]  Secondary Insurance:     PROGRESS NOTE:      Chart review completed.  Referral to outpatient response team for assist with new PCP  No other CM needs at this time.  CM to follow for any discharge needs.

## 2025-03-17 NOTE — TELEMEDICINE
Tele-Consultation - OB/GYN   Name: Hayley Brown 30 y.o. female I MRN: [id number]  Unit/Bed#:  I Date of Admission: 3/17/2025   Date of Service: 3/17/2025 I Hospital Day: 0   Consults  Physician Requesting Evaluation: Shaheen Cao MD   Reason for Evaluation / Principal Problem: hyperemesis gravidarum    Assessment & Plan  Hyperemesis gravidarum  Recommend scheduled Zofran instead of single dose. Pt may continue to try using home Zofran as she states it helps at home for three days before it stops working  Recommend PRN Reglan  Recommend Phenergan Suppository  Recommend pt not to use home Promethazine, as she states it does not help at all  Ok to continue Methylprednisolone 16 mg TID IV  Recommend EKG given multiple antiemetics to monitor QT interval  Pt reports 10 lb weight loss in 1 day - recommend daily weights  Pt counseled on above. Informed that medications ARE SAFE FOR PREGNANCY.  Pt may continue with STEROIDS which are also SAFE in pregnancy, especially given gestational age is ADVANCED (14+ weeks) which makes this favorable compared to using steroids in the first trimester.     After using steroids for three days, they should be TAPERED OVER 2 WEEKS TO THE LOWEST EFFECTIVE DOSE, limiting total duration of use to 6 weeks.   Hypokalemia  Managed per SLIM  Increased anion gap metabolic acidosis  Managed per SLIM  SIRS (systemic inflammatory response syndrome) (HCC)  Managed per SLIM  14 weeks gestation of pregnancy  Per [facility] notes, pt was 12w 3d on 3/3/25, making LINH 9/12/25      History of Present Illness   Hayley Brown is a 30 y.o. female who presents with hyperemesis gravidarum.    Review of Systems   Constitutional:  Positive for appetite change, fatigue and unexpected weight change.   Gastrointestinal:  Positive for abdominal pain, nausea and vomiting (and recent hematemesis).   Genitourinary:  Negative for pelvic pain, vaginal bleeding and vaginal pain.     Historical Information    Medical History Review: I have reviewed the patient's PMH, PSH, Social History, Family History, Meds, and Allergies       Objective :  Temp:  [97.3 °F (36.3 °C)-99.5 °F (37.5 °C)] 97.7 °F (36.5 °C)  HR:  [] 126  BP: ()/(54-93) 92/68  Resp:  [16-20] 18  SpO2:  [96 %-98 %] 98 %  O2 Device: None (Room air)    Physical Exam  Resting, uneasy. Not vomiting. Drinking black tea from a mug.       Lab Results: I have reviewed all available labs.     Administrative Statements   I have spent a total time of 30 minutes in caring for this patient on the day of the visit/encounter including Diagnostic results, Prognosis, Risks and benefits of tx options, Instructions for management, Impressions, Counseling / Coordination of care, Documenting in the medical record, Reviewing/placing orders in the medical record (including tests, medications, and/or procedures), Obtaining or reviewing history  , and Communicating with other healthcare professionals .    Administrative Statements   VIRTUAL CARE DOCUMENTATION:     1. This service was provided via Telemedicine using [employer]     2. Parties in the room with patient during teleconsult Family member: partner     3. Confidentiality My office door was closed     4. Participants No one else was in the room    5. Patient acknowledged consent and understanding of privacy and security of the  Telemedicine consult. I informed the patient that I have reviewed their record in Epic and presented the opportunity for them to ask any questions regarding the visit today.  The patient agreed to participate.    6. I have spent a total time of 30 minutes in caring for this patient on the day of the visit/encounter including see above, not including the time spent for establishing the audio/video connection.

## 2025-03-17 NOTE — ASSESSMENT & PLAN NOTE
Recommend scheduled Zofran instead of single dose. Pt may continue to try using home Zofran as she states it helps at home for three days before it stops working  Recommend PRN Reglan  Recommend Phenergan Suppository  Recommend pt not to use home Promethazine, as she states it does not help at all  Ok to continue Methylprednisolone 16 mg TID IV  Recommend EKG given multiple antiemetics to monitor QT interval  Pt reports 10 lb weight loss in 1 day - recommend daily weights  Pt counseled on above. Informed that medications ARE SAFE FOR PREGNANCY.  Pt may continue with STEROIDS which are also SAFE in pregnancy, especially given gestational age is ADVANCED (14+ weeks) which makes this favorable compared to using steroids in the first trimester.     After using steroids for three days, they should be TAPERED OVER 2 WEEKS TO THE LOWEST EFFECTIVE DOSE, limiting total duration of use to 6 weeks.

## 2025-03-17 NOTE — ED PROVIDER NOTES
Time reflects when diagnosis was documented in both MDM as applicable and the Disposition within this note       Time User Action Codes Description Comment    3/17/2025  3:53 AM Alberto Mortensen [T73.0XXA,  E87.29] Starvation ketoacidosis     3/17/2025  3:54 AM Alberto Mortensen [O21.0] Hyperemesis gravidarum     3/17/2025  3:54 AM Alberto Mortensen [Z3A.14] 14 weeks gestation of pregnancy     3/17/2025  3:55 AM Alberto Mortensen [R11.2] Nausea and vomiting           ED Disposition       ED Disposition   Admit    Condition   Stable    Date/Time   Mon Mar 17, 2025  5:03 AM    Comment   Case was discussed with Tres Barbosa PA-C and the patient's admission status was agreed to be Admission Status: observation status to the service of Dr. Meza .               Assessment & Plan       Medical Decision Making  Medical complexity: 30-year-old  at approximately 14-week EGA presenting with symptoms concerning for hyperemesis gravidarum.  Will evaluate for metabolites, severe dehydration, and severe ketosis with lab work.  Will dose antiemetic, and anticipate p.o. challenge.  If patient unable to tolerate p.o. and has signs of moderate or severe dehydration, then may require admission.    Reassessment/disposition: Patient had several more episodes of spit up without denise vomiting here in the emergency department.  She continued to suffer from significant nausea.  This led her to only take a few sips of liquid on every p.o. challenge.  She was essentially unable to tolerate p.o. here in the emergency department after several hours.  I then went in and discussed with the patient that if she was not able to tolerate p.o., she may require admission for further hydration and management of her hyperemesis gravidarum.  Patient stated that this was her desire was to be admitted to the hospital at this point given the severity of her symptoms.  Given that her labs do demonstrate significant abnormalities, I do not feel  this is unreasonable.  Case was discussed with OB/GYN on-call provider (Dr. Loving) who agreed that patient was stable enough to stay here at the [facility] given the fact that she is only 14 weeks EGA and that it would not change her management to be somewhere with OB/GYN in house.  Case was then discussed with the hospitalist on-call (Gustavo Barbosa PA-C) who accepted the patient for observation admission for management of her metabolic Acidosis, hyperemesis gravidarum with ketosis.    Amount and/or Complexity of Data Reviewed  Labs: ordered.    Risk  Prescription drug management.  Decision regarding hospitalization.        ED Course as of 03/17/25 0654   Mon Mar 17, 2025   0152 Patient unfortunately has developed some akathisia from her Reglan.  Benadryl ordered.   0317 Patient certainly has significant lab abnormalities which would warrant admission given her ongoing GI losses.  Patient however does not desire admission today and would like to trial p.o. challenge.  I told her that in order for me to feel comfortable sending her home patient would need to drink no less than 2 cups of liquid, keep it down for more than 30 minutes, and provide us a urine sample to prove that she is not overly dehydrated.  Patient will attempt p.o. challenge now.   0545 Fetal heart tones in the 170s.  Patient denies any complications of this pregnancy.  On review of patient's imaging studies, patient has had confirmatory ultrasounds which showed hilario IUP without concern for molar pregnancy.  Patient recently had nuchal translucency study performed as well.  She follows with [facility] but is desiring to switch to [facility].       Medications   dextrose 5% lactated Ringer's bolus 1,000 mL (0 mL Intravenous Stopped 3/17/25 0344)   metoclopramide (REGLAN) injection 10 mg (10 mg Intravenous Given 3/17/25 0141)   pantoprazole (PROTONIX) injection 40 mg (40 mg Intravenous Given 3/17/25 0154)   diphenhydrAMINE  (BENADRYL) injection 25 mg (25 mg Intravenous Given 3/17/25 0153)   thiamine (VITAMIN B1) 100 mg in sodium chloride 0.9 % 50 mL IVPB (0 mg Intravenous Stopped 3/17/25 0428)   thiamine (VITAMIN B1) 100 mg/mL injection **ADS Override Pull** (  Override Pull 3/17/25 0218)   ondansetron (ZOFRAN) injection 4 mg (4 mg Intravenous Given 3/17/25 0518)       ED Risk Strat Scores                            SBIRT 22yo+      Flowsheet Row Most Recent Value   Initial Alcohol Screen: US AUDIT-C     1. How often do you have a drink containing alcohol? 0 Filed at: 03/17/2025 0139   2. How many drinks containing alcohol do you have on a typical day you are drinking?  0 Filed at: 03/17/2025 0139   3a. Male UNDER 65: How often do you have five or more drinks on one occasion? 0 Filed at: 03/17/2025 0139   3b. FEMALE Any Age, or MALE 65+: How often do you have 4 or more drinks on one occassion? 0 Filed at: 03/17/2025 0139   Audit-C Score 0 Filed at: 03/17/2025 0139   ALEX: How many times in the past year have you...    Used an illegal drug or used a prescription medication for non-medical reasons? Never Filed at: 03/17/2025 0139                            History of Present Illness       Chief Complaint   Patient presents with    Vomiting     Pt reports that she has been vomiting on and off since she was 6 weeks pregnant. Pt reports she is 14w3d pregnant with her 5th pregnancy. Pt states that she is having pain in her abd and has not been able to have a bm in the last 4 days, Pt denies any spotting or vaginal discharge.        History reviewed. No pertinent past medical history.   History reviewed. No pertinent surgical history.   History reviewed. No pertinent family history.   Social History     Tobacco Use    Smoking status: Never    Smokeless tobacco: Never   Vaping Use    Vaping status: Never Used   Substance Use Topics    Alcohol use: Never     Comment: socially    Drug use: Never      E-Cigarette/Vaping    E-Cigarette Use Never  User       E-Cigarette/Vaping Substances      I have reviewed and agree with the history as documented.     This is a 30-year-old G5, P4 female at approximately 14 weeks EGA based on LMP who presents to the emergency department today with concern for ongoing nausea, vomiting, difficulty tolerating p.o. intake, and abdominal cramping pains.  Patient reports that her symptoms have been present since about 6 weeks of gestational age.  Patient has been seen in multiple emergency departments over the past few weeks with similar symptoms.  She says that it seems like her nausea and vomiting acutely worsened sometime just before she left for the [place] and middle February.  Since returning home, she has been to multiple office visits, urgent care visits, and emergency department visits.  She reports that she has been prescribed Zofran and Phenergan and has been taking at home for nausea and vomiting however unfortunately the patient was taking the medication over the last few days with no relief of her nausea and vomiting.  She reports that nearly every attempt at p.o. intake over the last few days has caused her to vomit very shortly afterwards.  Patient reports that now she is feeling weak, and very thirsty.  Patient states that when she was at the emergency department a few days ago, she was given a medication (I believe this was Reglan) which helped her nausea and made it so that she did not vomit, however soon as she got to the parking lot she began vomiting again.  Patient does report a history of hyperemesis gravidarum during her third pregnancy which required what sounds like an antiemetic pump.  I have no records of this from our EMR.  Patient denies any known complications with this pregnancy thus far and is denying specifically any gush of fluid, vaginal bleeding, contractions.  Thus far in this pregnancy she has not appreciated much fetal movement though she occasionally feels a flutter in her lower  abdomen.         Review of Systems   Constitutional:  Positive for appetite change and fatigue. Negative for chills and fever.   HENT:  Negative for ear pain and sore throat.    Eyes:  Negative for pain and visual disturbance.   Respiratory:  Negative for cough and shortness of breath.    Cardiovascular:  Negative for chest pain and palpitations.   Gastrointestinal:  Positive for abdominal pain, nausea and vomiting.   Genitourinary:  Negative for dysuria and hematuria.   Musculoskeletal:  Negative for arthralgias and back pain.   Skin:  Negative for color change and rash.   Neurological:  Positive for weakness. Negative for seizures and syncope.   All other systems reviewed and are negative.          Objective       ED Triage Vitals   Temperature Pulse Blood Pressure Respirations SpO2 Patient Position - Orthostatic VS   03/17/25 0137 03/17/25 0137 03/17/25 0137 03/17/25 0137 03/17/25 0137 03/17/25 0137   99.5 °F (37.5 °C) (!) 118 130/93 20 96 % Sitting      Temp Source Heart Rate Source BP Location FiO2 (%) Pain Score    03/17/25 0137 03/17/25 0137 03/17/25 0137 -- 03/17/25 0604    Temporal Monitor Left arm  No Pain      Vitals      Date and Time Temp Pulse SpO2 Resp BP Pain Score FACES Pain Rating User   03/17/25 0611 -- -- -- -- -- No Pain -- S   03/17/25 0604 97.3 °F (36.3 °C) 95 97 % 18 116/68 No Pain -- S   03/17/25 0400 -- 105 96 % 16 98/60 -- -- PP   03/17/25 0300 -- 99 96 % 16 92/54 -- -- PP   03/17/25 0215 -- 96 97 % -- -- -- -- CD   03/17/25 0137 99.5 °F (37.5 °C) 118 96 % 20 130/93 -- -- CD            Physical Exam  Vitals and nursing note reviewed.   Constitutional:       General: She is not in acute distress.     Appearance: She is well-developed and normal weight.      Comments: Patient is evaluated by lying supine on the stretcher, she spits frequently into an emesis basin, though does not have any denise emesis during my evaluation.   HENT:      Head: Normocephalic and atraumatic.      Right Ear:  External ear normal.      Left Ear: External ear normal.      Nose: Nose normal. No congestion or rhinorrhea.      Mouth/Throat:      Mouth: Mucous membranes are dry.      Pharynx: Oropharynx is clear. No oropharyngeal exudate or posterior oropharyngeal erythema.   Eyes:      General: No scleral icterus.     Extraocular Movements: Extraocular movements intact.      Conjunctiva/sclera: Conjunctivae normal.      Pupils: Pupils are equal, round, and reactive to light.   Cardiovascular:      Rate and Rhythm: Regular rhythm. Tachycardia present.      Pulses: Normal pulses.      Heart sounds: Normal heart sounds. No murmur heard.  Pulmonary:      Effort: Pulmonary effort is normal. No respiratory distress.      Breath sounds: Normal breath sounds. No wheezing or rhonchi.   Abdominal:      General: Abdomen is flat. There is no distension.      Palpations: Abdomen is soft.      Tenderness: There is abdominal tenderness (Mild diffuse epigastric). There is no guarding.      Comments: Palpable uterine fundus below the level of the umbilicus consistent with 14-week EGA   Musculoskeletal:         General: No swelling.      Cervical back: Neck supple. No rigidity.      Right lower leg: No edema.      Left lower leg: No edema.   Lymphadenopathy:      Cervical: No cervical adenopathy.   Skin:     General: Skin is warm and dry.      Capillary Refill: Capillary refill takes 2 to 3 seconds.      Coloration: Skin is not jaundiced.      Findings: No rash.   Neurological:      General: No focal deficit present.      Mental Status: She is alert and oriented to person, place, and time. Mental status is at baseline.   Psychiatric:         Mood and Affect: Mood normal.         Behavior: Behavior normal.         Results Reviewed       Procedure Component Value Units Date/Time    Lipase [[id number]]  (Normal) Collected: 03/17/25 0141    Lab Status: Final result Specimen: Blood from Arm, Right Updated: 03/17/25 0212     Lipase 37 u/L     Beta  Hydroxybutyrate [[id number]]  (Abnormal) Collected: 03/17/25 0141    Lab Status: Final result Specimen: Blood from Arm, Right Updated: 03/17/25 0212     Beta- Hydroxybutyrate 2.69 mmol/L     Comprehensive metabolic panel [[id number]]  (Abnormal) Collected: 03/17/25 0141    Lab Status: Final result Specimen: Blood from Arm, Right Updated: 03/17/25 0212     Sodium 141 mmol/L      Potassium 3.4 mmol/L      Chloride 106 mmol/L      CO2 17 mmol/L      ANION GAP 18 mmol/L      BUN 9 mg/dL      Creatinine 0.58 mg/dL      Glucose 124 mg/dL      Calcium 10.0 mg/dL      AST 31 U/L      ALT 53 U/L      Alkaline Phosphatase 47 U/L      Total Protein 7.6 g/dL      Albumin 4.4 g/dL      Total Bilirubin 0.81 mg/dL      eGFR 124 ml/min/1.73sq m     Narrative:      National Kidney Disease Foundation guidelines for Chronic Kidney Disease (CKD):     Stage 1 with normal or high GFR (GFR > 90 mL/min/1.73 square meters)    Stage 2 Mild CKD (GFR = 60-89 mL/min/1.73 square meters)    Stage 3A Moderate CKD (GFR = 45-59 mL/min/1.73 square meters)    Stage 3B Moderate CKD (GFR = 30-44 mL/min/1.73 square meters)    Stage 4 Severe CKD (GFR = 15-29 mL/min/1.73 square meters)    Stage 5 End Stage CKD (GFR <15 mL/min/1.73 square meters)  Note: GFR calculation is accurate only with a steady state creatinine    Phosphorus [[id number]]  (Normal) Collected: 03/17/25 0141    Lab Status: Final result Specimen: Blood from Arm, Right Updated: 03/17/25 0212     Phosphorus 3.9 mg/dL     Magnesium [[id number]]  (Normal) Collected: 03/17/25 0141    Lab Status: Final result Specimen: Blood from Arm, Right Updated: 03/17/25 0212     Magnesium 1.9 mg/dL     CBC and differential [[id number]]  (Abnormal) Collected: 03/17/25 0141    Lab Status: Final result Specimen: Blood from Arm, Right Updated: 03/17/25 0152     WBC 15.67 Thousand/uL      RBC 5.02 Million/uL      Hemoglobin 14.9 g/dL      Hematocrit 43.7 %      MCV 87 fL      MCH 29.7 pg      MCHC 34.1 g/dL      RDW  11.9 %      MPV 11.4 fL      Platelets 258 Thousands/uL      nRBC 0 /100 WBCs      Segmented % 78 %      Immature Grans % 0 %      Lymphocytes % 17 %      Monocytes % 4 %      Eosinophils Relative 1 %      Basophils Relative 0 %      Absolute Neutrophils 11.97 Thousands/µL      Absolute Immature Grans 0.04 Thousand/uL      Absolute Lymphocytes 2.73 Thousands/µL      Absolute Monocytes 0.68 Thousand/µL      Eosinophils Absolute 0.21 Thousand/µL      Basophils Absolute 0.04 Thousands/µL     UA w Reflex to Microscopic w Reflex to Culture [[id number]]     Lab Status: No result Specimen: Urine             No orders to display       Procedures    ED Medication and Procedure Management   Prior to Admission Medications   Prescriptions Last Dose Informant Patient Reported? Taking?   Aurovela FE  1-20 MG-MCG per tablet Not Taking  Yes No   Sig: TOME 1 TABLETA POR V A ORAL TODOS LOS D AS   Patient not taking: Reported on 3/17/2025   fluticasone (FLONASE) 50 mcg/act nasal spray Not Taking  No No   Si spray into each nostril 2 (two) times a day   Patient not taking: Reported on 3/17/2025   ondansetron (ZOFRAN) 4 mg tablet 3/16/2025  No Yes   Sig: Take 1 tablet (4 mg total) by mouth every 6 (six) hours      Facility-Administered Medications: None     Current Discharge Medication List        CONTINUE these medications which have NOT CHANGED    Details   Aurovela FE  1-20 MG-MCG per tablet TOME 1 TABLETA POR V A ORAL TODOS LOS D AS      fluticasone (FLONASE) 50 mcg/act nasal spray 1 spray into each nostril 2 (two) times a day  Qty: 16 g, Refills: 0    Associated Diagnoses: Viral URI with cough      ondansetron (ZOFRAN) 4 mg tablet Take 1 tablet (4 mg total) by mouth every 6 (six) hours  Qty: 12 tablet, Refills: 0    Associated Diagnoses: Nausea and vomiting; Pregnancy           No discharge procedures on file.  ED SEPSIS DOCUMENTATION   Time reflects when diagnosis was documented in both MDM as applicable and the  Disposition within this note       Time User Action Codes Description Comment    3/17/2025  3:53 AM Alberto Mortensen [T73.0XXA,  E87.29] Starvation ketoacidosis     3/17/2025  3:54 AM Alberto Mortensen [O21.0] Hyperemesis gravidarum     3/17/2025  3:54 AM Alberto Mortensen [Z3A.14] 14 weeks gestation of pregnancy     3/17/2025  3:55 AM Alberto Mortensen [R11.2] Nausea and vomiting                  Alberto Mortensen MD  03/17/25 0609

## 2025-03-17 NOTE — ASSESSMENT & PLAN NOTE
SIRS criteria 2/4 (tachycardia, leukocytosis)  No clear source of infection  Likely reactive in the setting of ongoing episodes of vomiting  UA ordered and pending  Received IV fluids in the ER  --Will continue IV fluids  --Check procalcitonin  --Follow UA results  --Am labs  --Monitor hemodynamic status

## 2025-03-17 NOTE — PLAN OF CARE
Problem: PAIN - ADULT  Goal: Verbalizes/displays adequate comfort level or baseline comfort level  Description: Interventions:  - Encourage patient to monitor pain and request assistance  - Assess pain using appropriate pain scale  - Administer analgesics based on type and severity of pain and evaluate response  - Implement non-pharmacological measures as appropriate and evaluate response  - Consider cultural and social influences on pain and pain management  - Notify physician/advanced practitioner if interventions unsuccessful or patient reports new pain  Outcome: Progressing     Problem: INFECTION - ADULT  Goal: Absence or prevention of progression during hospitalization  Description: INTERVENTIONS:  - Assess and monitor for signs and symptoms of infection  - Monitor lab/diagnostic results  - Monitor all insertion sites, i.e. indwelling lines, tubes, and drains  - Monitor endotracheal if appropriate and nasal secretions for changes in amount and color  - [place] appropriate cooling/warming therapies per order  - Administer medications as ordered  - Instruct and encourage patient and family to use good hand hygiene technique  - Identify and instruct in appropriate isolation precautions for identified infection/condition  Outcome: Progressing  Goal: Absence of fever/infection during neutropenic period  Description: INTERVENTIONS:  - Monitor WBC    Outcome: Progressing     Problem: SAFETY ADULT  Goal: Patient will remain free of falls  Description: INTERVENTIONS:  - Educate patient/family on patient safety including physical limitations  - Instruct patient to call for assistance with activity   - Consult OT/PT to assist with strengthening/mobility   - Keep Call bell within reach  - Keep bed low and locked with side rails adjusted as appropriate  - Keep care items and personal belongings within reach  - Initiate and maintain comfort rounds  - Make Fall Risk Sign visible to staff  - Offer Toileting every 2 Hours,  in advance of need  - Initiate/Maintain bed alarm  - Obtain necessary fall risk management equipment:   - Apply yellow socks and bracelet for high fall risk patients  - Consider moving patient to room near nurses station  Outcome: Progressing  Goal: Maintain or return to baseline ADL function  Description: INTERVENTIONS:  -  Assess patient's ability to carry out ADLs; assess patient's baseline for ADL function and identify physical deficits which impact ability to perform ADLs (bathing, care of mouth/teeth, toileting, grooming, dressing, etc.)  - Assess/evaluate cause of self-care deficits   - Assess range of motion  - Assess patient's mobility; develop plan if impaired  - Assess patient's need for assistive devices and provide as appropriate  - Encourage maximum independence but intervene and supervise when necessary  - Involve family in performance of ADLs  - Assess for home care needs following discharge   - Consider OT consult to assist with ADL evaluation and planning for discharge  - Provide patient education as appropriate  Outcome: Progressing  Goal: Maintains/Returns to pre admission functional level  Description: INTERVENTIONS:  - Perform AM-PAC 6 Click Basic Mobility/ Daily Activity assessment daily.  - Set and communicate daily mobility goal to care team and patient/family/caregiver.   - Collaborate with rehabilitation services on mobility goals if consulted  - Perform Range of Motion 2 times a day.  - Reposition patient every 2 hours.  - Dangle patient 2 times a day  - Stand patient 2 times a day  - Ambulate patient 2 times a day  - Out of bed to chair 2 times a day   - Out of bed for meals 2 times a day  - Out of bed for toileting  - Record patient progress and toleration of activity level   Outcome: Progressing     Problem: DISCHARGE PLANNING  Goal: Discharge to home or other facility with appropriate resources  Description: INTERVENTIONS:  - Identify barriers to discharge w/patient and caregiver  -  Arrange for needed discharge resources and transportation as appropriate  - Identify discharge learning needs (meds, wound care, etc.)  - Arrange for interpretive services to assist at discharge as needed  - Refer to Case Management Department for coordinating discharge planning if the patient needs post-hospital services based on physician/advanced practitioner order or complex needs related to functional status, cognitive ability, or social support system  Outcome: Progressing     Problem: Knowledge Deficit  Goal: Patient/family/caregiver demonstrates understanding of disease process, treatment plan, medications, and discharge instructions  Description: Complete learning assessment and assess knowledge base.  Interventions:  - Provide teaching at level of understanding  - Provide teaching via preferred learning methods  Outcome: Progressing     Problem: CARDIOVASCULAR - ADULT  Goal: Maintains optimal cardiac output and hemodynamic stability  Description: INTERVENTIONS:  - Monitor I/O, vital signs and rhythm  - Monitor for S/S and trends of decreased cardiac output  - Administer and titrate ordered vasoactive medications to optimize hemodynamic stability  - Assess quality of pulses, skin color and temperature  - Assess for signs of decreased coronary artery perfusion  - Instruct patient to report change in severity of symptoms  Outcome: Progressing  Goal: Absence of cardiac dysrhythmias or at baseline rhythm  Description: INTERVENTIONS:  - Continuous cardiac monitoring, vital signs, obtain 12 lead EKG if ordered  - Administer antiarrhythmic and heart rate control medications as ordered  - Monitor electrolytes and administer replacement therapy as ordered  Outcome: Progressing     Problem: GASTROINTESTINAL - ADULT  Goal: Minimal or absence of nausea and/or vomiting  Description: INTERVENTIONS:  - Administer IV fluids if ordered to ensure adequate hydration  - Maintain NPO status until nausea and vomiting are  resolved  - Nasogastric tube if ordered  - Administer ordered antiemetic medications as needed  - Provide nonpharmacologic comfort measures as appropriate  - Advance diet as tolerated, if ordered  - Consider nutrition services referral to assist patient with adequate nutrition and appropriate food choices  Outcome: Progressing  Goal: Maintains or returns to baseline bowel function  Description: INTERVENTIONS:  - Assess bowel function  - Encourage oral fluids to ensure adequate hydration  - Administer IV fluids if ordered to ensure adequate hydration  - Administer ordered medications as needed  - Encourage mobilization and activity  - Consider nutritional services referral to assist patient with adequate nutrition and appropriate food choices  Outcome: Progressing  Goal: Maintains adequate nutritional intake  Description: INTERVENTIONS:  - Monitor percentage of each meal consumed  - Identify factors contributing to decreased intake, treat as appropriate  - Assist with meals as needed  - Monitor I&O, weight, and lab values if indicated  - Obtain nutrition services referral as needed  Outcome: Progressing  Goal: Establish and maintain optimal ostomy function  Description: INTERVENTIONS:  - Assess bowel function  - Encourage oral fluids to ensure adequate hydration  - Administer IV fluids if ordered to ensure adequate hydration   - Administer ordered medications as needed  - Encourage mobilization and activity  - Nutrition services referral to assist patient with appropriate food choices  - Assess stoma site  - Consider wound care consult   Outcome: Progressing  Goal: Oral mucous membranes remain intact  Description: INTERVENTIONS  - Assess oral mucosa and hygiene practices  - Implement preventative oral hygiene regimen  - Implement oral medicated treatments as ordered  - Initiate Nutrition services referral as needed  Outcome: Progressing     Problem: METABOLIC, FLUID AND ELECTROLYTES - ADULT  Goal: Electrolytes  maintained within normal limits  Description: INTERVENTIONS:  - Monitor labs and assess patient for signs and symptoms of electrolyte imbalances  - Administer electrolyte replacement as ordered  - Monitor response to electrolyte replacements, including repeat lab results as appropriate  - Instruct patient on fluid and nutrition as appropriate  Outcome: Progressing  Goal: Fluid balance maintained  Description: INTERVENTIONS:  - Monitor labs   - Monitor I/O and WT  - Instruct patient on fluid and nutrition as appropriate  - Assess for signs & symptoms of volume excess or deficit  Outcome: Progressing  Goal: Glucose maintained within target range  Description: INTERVENTIONS:  - Monitor Blood Glucose as ordered  - Assess for signs and symptoms of hyperglycemia and hypoglycemia  - Administer ordered medications to maintain glucose within target range  - Assess nutritional intake and initiate nutrition service referral as needed  Outcome: Progressing

## 2025-03-17 NOTE — ASSESSMENT & PLAN NOTE
Patient reports a strong history of hyperemesis gravidarum in prior pregnancies including need for central venous access for 3 weeks and previous pregnancy.  She has been sick for approximately 4 weeks in this pregnancy, previously managed by [facility].   Most recently she received fluid resuscitation and antiemetics on 3/13/2025.    This admission is significant for a self-reported 10 pound weight loss in 1 day.  Initial management in the emergency department included the following  -Thiamine  -1 dose of Zofran  -1 dose of Reglan  -Initiation of methylprednisolone 16 milligrams 3 times daily  -Benadryl    Of note, patient states she has tried oral Zofran and oral dissolving Zofran at home with mixed results, normally she gets sick and it stops working after 3 days  -I encouraged her to continue using this as it is often one of the more helpful medications    Patient reports she has a prescription for promethazine 12.5 mg at home and states this does not help at all  -I suggest that she stop using this given she states it has not helped at all    Patient states she has not tried Phenergan in any form  -I encouraged this as a new therapy in suppository form    Patient states she has not tried vitamin B 6 or Unisom at any time  -I encouraged both of these medications as daily medicine to prevent and treat nausea and vomiting

## 2025-03-17 NOTE — ASSESSMENT & PLAN NOTE
Presented to the ER for evaluation of nausea and vomiting  Concerning for  hyperemesis gravidarum, patient is currently 14 EGA  Received IV fluids, Benadryl, Zofran and Protonix and thiamine in ER  ER attending discussed case with on-call OB/GYN  Attending.  Believes patient is okay to be treated here at [facility]  --Admit in observation  --Keep n.p.o. for now  --Continue IV fluids  --Initiated Solu-Medrol taper 60 mg IV 3 times daily with meals X 3 days  --Zofran as needed  --Orthostatic vital sign  --Am Labs  --Monitor hemodynamic status, electrolytes  --OB/GYN consult  --Supportive care

## 2025-03-17 NOTE — PLAN OF CARE
Problem: PAIN - ADULT  Goal: Verbalizes/displays adequate comfort level or baseline comfort level  Description: Interventions:  - Encourage patient to monitor pain and request assistance  - Assess pain using appropriate pain scale  - Administer analgesics based on type and severity of pain and evaluate response  - Implement non-pharmacological measures as appropriate and evaluate response  - Consider cultural and social influences on pain and pain management  - Notify physician/advanced practitioner if interventions unsuccessful or patient reports new pain  Outcome: Progressing     Problem: INFECTION - ADULT  Goal: Absence or prevention of progression during hospitalization  Description: INTERVENTIONS:  - Assess and monitor for signs and symptoms of infection  - Monitor lab/diagnostic results  - Monitor all insertion sites, i.e. indwelling lines, tubes, and drains  - Monitor endotracheal if appropriate and nasal secretions for changes in amount and color  - [place] appropriate cooling/warming therapies per order  - Administer medications as ordered  - Instruct and encourage patient and family to use good hand hygiene technique  - Identify and instruct in appropriate isolation precautions for identified infection/condition  Outcome: Progressing  Goal: Absence of fever/infection during neutropenic period  Description: INTERVENTIONS:  - Monitor WBC    Outcome: Progressing     Problem: SAFETY ADULT  Goal: Patient will remain free of falls  Description: INTERVENTIONS:  - Educate patient/family on patient safety including physical limitations  - Instruct patient to call for assistance with activity   - Consult OT/PT to assist with strengthening/mobility   - Keep Call bell within reach  - Keep bed low and locked with side rails adjusted as appropriate  - Keep care items and personal belongings within reach  - Initiate and maintain comfort rounds  - Make Fall Risk Sign visible to staff  - Offer Toileting every 2 Hours,  in advance of need  - Initiate/Maintain bed alarm  - Obtain necessary fall risk management equipment  - Apply yellow socks and bracelet for high fall risk patients  - Consider moving patient to room near nurses station  Outcome: Progressing  Goal: Maintain or return to baseline ADL function  Description: INTERVENTIONS:  -  Assess patient's ability to carry out ADLs; assess patient's baseline for ADL function and identify physical deficits which impact ability to perform ADLs (bathing, care of mouth/teeth, toileting, grooming, dressing, etc.)  - Assess/evaluate cause of self-care deficits   - Assess range of motion  - Assess patient's mobility; develop plan if impaired  - Assess patient's need for assistive devices and provide as appropriate  - Encourage maximum independence but intervene and supervise when necessary  - Involve family in performance of ADLs  - Assess for home care needs following discharge   - Consider OT consult to assist with ADL evaluation and planning for discharge  - Provide patient education as appropriate  Outcome: Progressing  Goal: Maintains/Returns to pre admission functional level  Description: INTERVENTIONS:  - Perform AM-PAC 6 Click Basic Mobility/ Daily Activity assessment daily.  - Set and communicate daily mobility goal to care team and patient/family/caregiver.   - Collaborate with rehabilitation services on mobility goals if consulted  - Dangle patient 3 times a day  - Stand patient 3 times a day  - Ambulate patient 3 times a day  - Out of bed to chair 3 times a day   - Out of bed for meals 3 times a day  - Out of bed for toileting  - Record patient progress and toleration of activity level   Outcome: Progressing     Problem: DISCHARGE PLANNING  Goal: Discharge to home or other facility with appropriate resources  Description: INTERVENTIONS:  - Identify barriers to discharge w/patient and caregiver  - Arrange for needed discharge resources and transportation as appropriate  -  Identify discharge learning needs (meds, wound care, etc.)  - Arrange for interpretive services to assist at discharge as needed  - Refer to Case Management Department for coordinating discharge planning if the patient needs post-hospital services based on physician/advanced practitioner order or complex needs related to functional status, cognitive ability, or social support system  Outcome: Progressing     Problem: Knowledge Deficit  Goal: Patient/family/caregiver demonstrates understanding of disease process, treatment plan, medications, and discharge instructions  Description: Complete learning assessment and assess knowledge base.  Interventions:  - Provide teaching at level of understanding  - Provide teaching via preferred learning methods  Outcome: Progressing     Problem: CARDIOVASCULAR - ADULT  Goal: Maintains optimal cardiac output and hemodynamic stability  Description: INTERVENTIONS:  - Monitor I/O, vital signs and rhythm  - Monitor for S/S and trends of decreased cardiac output  - Administer and titrate ordered vasoactive medications to optimize hemodynamic stability  - Assess quality of pulses, skin color and temperature  - Assess for signs of decreased coronary artery perfusion  - Instruct patient to report change in severity of symptoms  Outcome: Progressing  Goal: Absence of cardiac dysrhythmias or at baseline rhythm  Description: INTERVENTIONS:  - Continuous cardiac monitoring, vital signs, obtain 12 lead EKG if ordered  - Administer antiarrhythmic and heart rate control medications as ordered  - Monitor electrolytes and administer replacement therapy as ordered  Outcome: Progressing     Problem: GASTROINTESTINAL - ADULT  Goal: Minimal or absence of nausea and/or vomiting  Description: INTERVENTIONS:  - Administer IV fluids if ordered to ensure adequate hydration  - Maintain NPO status until nausea and vomiting are resolved  - Nasogastric tube if ordered  - Administer ordered antiemetic  medications as needed  - Provide nonpharmacologic comfort measures as appropriate  - Advance diet as tolerated, if ordered  - Consider nutrition services referral to assist patient with adequate nutrition and appropriate food choices  Outcome: Progressing  Goal: Maintains or returns to baseline bowel function  Description: INTERVENTIONS:  - Assess bowel function  - Encourage oral fluids to ensure adequate hydration  - Administer IV fluids if ordered to ensure adequate hydration  - Administer ordered medications as needed  - Encourage mobilization and activity  - Consider nutritional services referral to assist patient with adequate nutrition and appropriate food choices  Outcome: Progressing  Goal: Maintains adequate nutritional intake  Description: INTERVENTIONS:  - Monitor percentage of each meal consumed  - Identify factors contributing to decreased intake, treat as appropriate  - Assist with meals as needed  - Monitor I&O, weight, and lab values if indicated  - Obtain nutrition services referral as needed  Outcome: Progressing  Goal: Establish and maintain optimal ostomy function  Description: INTERVENTIONS:  - Assess bowel function  - Encourage oral fluids to ensure adequate hydration  - Administer IV fluids if ordered to ensure adequate hydration   - Administer ordered medications as needed  - Encourage mobilization and activity  - Nutrition services referral to assist patient with appropriate food choices  - Assess stoma site  - Consider wound care consult   Outcome: Progressing  Goal: Oral mucous membranes remain intact  Description: INTERVENTIONS  - Assess oral mucosa and hygiene practices  - Implement preventative oral hygiene regimen  - Implement oral medicated treatments as ordered  - Initiate Nutrition services referral as needed  Outcome: Progressing     Problem: METABOLIC, FLUID AND ELECTROLYTES - ADULT  Goal: Electrolytes maintained within normal limits  Description: INTERVENTIONS:  - Monitor labs  and assess patient for signs and symptoms of electrolyte imbalances  - Administer electrolyte replacement as ordered  - Monitor response to electrolyte replacements, including repeat lab results as appropriate  - Instruct patient on fluid and nutrition as appropriate  Outcome: Progressing  Goal: Fluid balance maintained  Description: INTERVENTIONS:  - Monitor labs   - Monitor I/O and WT  - Instruct patient on fluid and nutrition as appropriate  - Assess for signs & symptoms of volume excess or deficit  Outcome: Progressing  Goal: Glucose maintained within target range  Description: INTERVENTIONS:  - Monitor Blood Glucose as ordered  - Assess for signs and symptoms of hyperglycemia and hypoglycemia  - Administer ordered medications to maintain glucose within target range  - Assess nutritional intake and initiate nutrition service referral as needed  Outcome: Progressing

## 2025-03-17 NOTE — ASSESSMENT & PLAN NOTE
Increased anion gap metabolic acidosis  With an anion gap of 18 carbon dioxide of 17  Likely due to ongoing episodes of nausea and vomiting  --Continue IV fluid hydration  --Repeat BMP

## 2025-03-17 NOTE — ASSESSMENT & PLAN NOTE
Managed per SLIM  -I will order EKG  -EKG also beneficial in the setting of multiple antiemetics for monitoring of QT interval

## 2025-03-17 NOTE — H&P
H&P - Hospitalist   Name: Hayley Brown 30 y.o. female I MRN: [id number]  Unit/Bed#:  I Date of Admission: 3/17/2025   Date of Service: 3/17/2025 I Hospital Day: 0     Assessment & Plan  Hyperemesis gravidarum  Presented to the ER for evaluation of nausea and vomiting  Concerning for  hyperemesis gravidarum, patient is currently 14 EGA  Received IV fluids, Benadryl, Zofran and Protonix and thiamine in ER  ER attending discussed case with on-call OB/GYN  Attending.  Believes patient is okay to be treated here at [facility]  --Admit in observation  --Keep n.p.o. for now  --Continue IV fluids  --Initiated Solu-Medrol taper 60 mg IV 3 times daily with meals X 3 days  --Zofran as needed  --Orthostatic vital sign  --Am Labs  --Monitor hemodynamic status, electrolytes  --OB/GYN consult  --Supportive care   Hypokalemia  Potassium level 3.4  --Replace potassium  --Monitor potassium levels  Increased anion gap metabolic acidosis  Increased anion gap metabolic acidosis  With an anion gap of 18 carbon dioxide of 17  Likely due to ongoing episodes of nausea and vomiting  --Continue IV fluid hydration  --Repeat BMP  SIRS (systemic inflammatory response syndrome) (HCC)  SIRS criteria 2/4 (tachycardia, leukocytosis)  No clear source of infection  Likely reactive in the setting of ongoing episodes of vomiting  UA ordered and pending  Received IV fluids in the ER  --Will continue IV fluids  --Check procalcitonin  --Follow UA results  --Am labs  --Monitor hemodynamic status    14 weeks gestation of pregnancy  Patient with obstetric history G5, P4   Patient is currently 14-week EGA  Presented to the ER for evaluation of nausea and vomiting  Patient denies abdominal pain vaginal discharge/bleeding  Fetal heart tone in the 170s in the ER  --Continue management of nausea and vomiting  --Monitor fetal heart tones  --OB/GYN consult  --Supportive care      VTE Pharmacologic Prophylaxis:   Moderate Risk (Score 3-4) - Pharmacological  DVT Prophylaxis Contraindicated. Sequential Compression Devices Ordered.  Code Status: Level 1 - Full Code level 1 full code   Discussion with family: Patient declined call to .     Anticipated Length of Stay: Patient will be admitted on an observation basis with an anticipated length of stay of less than 2 midnights secondary to hyperemesis gravidarum, increased anion gap metabolic acidosis, SIRS, hypokalemia.    History of Present Illness   Chief Complaint: Nausea and vomiting    Hayley Brown is a 30 y.o. female with no significant PMH patient is G5, P4  14 weeks gestation who presents to the emergency room evaluation of complaint of ongoing episodes of nausea and vomiting.  Patient reports that her symptoms have been present since she has been 6 weeks gestation having progressively worse.  She states that she has had multiple emergency room visits over the past several weeks.  Patient states that she has been taking Zofran and Phenergan at home but without relief.  Nausea and vomiting is associated with abdominal cramps.  Patient denies having any vaginal discharge/bleeding.  Patient also denies having any chest pain, shortness of breath, fever, chills, lightheadedness, dizziness.  Patient does endorse having decreased p.o. intake as well as feeling fatigue and weak.    Workup in the emergency room included labs significant for potassium of 3.7, carbon dioxide of 17, anion gap of 18, creatinine of 0.58, ALT of 53, procalcitonin of 0.05, WBC of 15.67, TSH of 0.010, beta hydroxybutyrate of 2.69.  In the ER patient treated with Zofran 4 mg IV, Protonix 40 mg IV, thiamine 100 mg IV, dextrose 5% and lactated Ringer's 1 L.  Due to ongoing episodes of vomiting patient was given Reglan 10 mg IV and had possible akathisia after administration of Reglan and received Benadryl 25 mg IV with improvement.  Patient continued to experience severe nausea while in the ER.  ER attending discussed case with  on-call OB/GYN attending.  GYN attending believe patient is appropriate to be manage here at [facility].    Patient is being admitted on observation status [facility] care for further management of hyperemesis gravidarum, hypokalemia, SIRS, increased anion gap metabolic acidosis.    Review of Systems   Constitutional:  Positive for activity change. Negative for chills, fatigue and fever.   Eyes:  Negative for photophobia and visual disturbance.   Respiratory:  Negative for cough, chest tightness, shortness of breath and wheezing.    Cardiovascular:  Negative for chest pain, palpitations and leg swelling.   Gastrointestinal:  Positive for nausea and vomiting. Negative for abdominal pain.   Genitourinary:  Negative for difficulty urinating, dysuria and flank pain.   Musculoskeletal:  Negative for arthralgias, gait problem, neck pain and neck stiffness.   Skin:  Negative for rash and wound.   Neurological:  Negative for dizziness, tremors, syncope, weakness and headaches.   Psychiatric/Behavioral:  Negative for agitation and confusion. The patient is not nervous/anxious.        Historical Information   History reviewed. No pertinent past medical history.  History reviewed. No pertinent surgical history.  Social History     Tobacco Use    Smoking status: Never    Smokeless tobacco: Never   Vaping Use    Vaping status: Never Used   Substance and Sexual Activity    Alcohol use: Never     Comment: socially    Drug use: Never    Sexual activity: Yes     Partners: Male     E-Cigarette/Vaping    E-Cigarette Use Never User      E-Cigarette/Vaping Substances     History reviewed. No pertinent family history.  Social History:  Marital Status: Single   Occupation:   Patient Pre-hospital Living Situation: Home  Patient Pre-hospital Level of Mobility: walks  Patient Pre-hospital Diet Restrictions: None reported     Meds/Allergies   I have reviewed home medications using recent Epic encounter.  Prior to Admission medications     Medication Sig Start Date End Date Taking? Authorizing Provider   Aurovela FE 1/20 1-20 MG-MCG per tablet TOME 1 TABLETA POR V A ORAL TONOHELIA LEONARD AS    Historical Provider, MD   fluticasone (FLONASE) 50 mcg/act nasal spray 1 spray into each nostril 2 (two) times a day 4/10/24   Alexia Galo, DO   ondansetron (ZOFRAN) 4 mg tablet Take 1 tablet (4 mg total) by mouth every 6 (six) hours 2/16/25   Sony Barrientos, DO     No Known Allergies    Objective :  Temp:  [97.3 °F (36.3 °C)-99.5 °F (37.5 °C)] 97.3 °F (36.3 °C)  HR:  [] 95  BP: ()/(54-93) 116/68  Resp:  [16-20] 18  SpO2:  [96 %-97 %] 97 %  O2 Device: None (Room air)    Physical Exam  Constitutional:       General: She is not in acute distress.     Appearance: She is ill-appearing.   HENT:      Head: Normocephalic and atraumatic.      Mouth/Throat:      Mouth: Mucous membranes are dry.      Pharynx: Oropharynx is clear.   Eyes:      Extraocular Movements: Extraocular movements intact.      Pupils: Pupils are equal, round, and reactive to light.   Cardiovascular:      Rate and Rhythm: Normal rate and regular rhythm.      Pulses: Normal pulses.   Pulmonary:      Effort: No respiratory distress.      Breath sounds: No stridor. No wheezing, rhonchi or rales.   Abdominal:      Palpations: There is no mass.   Musculoskeletal:      Cervical back: Normal range of motion and neck supple.      Right lower leg: No edema.      Left lower leg: No edema.   Skin:     General: Skin is warm and dry.      Capillary Refill: Capillary refill takes less than 2 seconds.      Coloration: Skin is not jaundiced or pale.      Findings: No erythema or rash.   Neurological:      Mental Status: She is alert and oriented to person, place, and time.   Psychiatric:         Mood and Affect: Mood normal.          Lines/Drains:            Lab Results: I have reviewed the following results:  Results from last 7 days   Lab Units 03/17/25  0141   WBC Thousand/uL 15.67*   HEMOGLOBIN  "g/dL 14.9   HEMATOCRIT % 43.7   PLATELETS Thousands/uL 258   SEGS PCT % 78*   LYMPHO PCT % 17   MONO PCT % 4   EOS PCT % 1     Results from last 7 days   Lab Units 03/17/25  0141   SODIUM mmol/L 141   POTASSIUM mmol/L 3.4*   CHLORIDE mmol/L 106   CO2 mmol/L 17*   BUN mg/dL 9   CREATININE mg/dL 0.58*   ANION GAP mmol/L 18*   CALCIUM mg/dL 10.0   ALBUMIN g/dL 4.4   TOTAL BILIRUBIN mg/dL 0.81   ALK PHOS U/L 47   ALT U/L 53*   AST U/L 31   GLUCOSE RANDOM mg/dL 124             No results found for: \"HGBA1C\"  Results from last 7 days   Lab Units 03/17/25  0641   PROCALCITONIN ng/ml <0.05       Imaging Results Review: No pertinent imaging studies reviewed.  Other Study Results Review: No additional pertinent studies reviewed.    Administrative Statements   I have spent a total time of 20 minutes in caring for this patient on the day of the visit/encounter including Documenting in the medical record, Reviewing/placing orders in the medical record (including tests, medications, and/or procedures), Obtaining or reviewing history  , and Communicating with other healthcare professionals .    ** Please Note: This note has been constructed using a voice recognition system. **    "

## 2025-03-18 VITALS
SYSTOLIC BLOOD PRESSURE: 101 MMHG | OXYGEN SATURATION: 99 % | HEART RATE: 79 BPM | WEIGHT: 122.58 LBS | RESPIRATION RATE: 18 BRPM | BODY MASS INDEX: 22.56 KG/M2 | TEMPERATURE: 98.8 F | HEIGHT: 62 IN | DIASTOLIC BLOOD PRESSURE: 47 MMHG

## 2025-03-18 PROBLEM — N30.00 ACUTE CYSTITIS WITHOUT HEMATURIA: Status: ACTIVE | Noted: 2025-03-18

## 2025-03-18 PROBLEM — E05.90 SUBCLINICAL HYPERTHYROIDISM: Status: ACTIVE | Noted: 2025-03-18

## 2025-03-18 LAB
ALBUMIN SERPL BCG-MCNC: 3 G/DL (ref 3.5–5)
ALP SERPL-CCNC: 29 U/L (ref 34–104)
ALT SERPL W P-5'-P-CCNC: 35 U/L (ref 7–52)
ANION GAP SERPL CALCULATED.3IONS-SCNC: 8 MMOL/L (ref 4–13)
AST SERPL W P-5'-P-CCNC: 27 U/L (ref 13–39)
BACTERIA UR CULT: NORMAL
BASOPHILS # BLD AUTO: 0.04 THOUSANDS/ÂΜL (ref 0–0.1)
BASOPHILS NFR BLD AUTO: 0 % (ref 0–1)
BILIRUB SERPL-MCNC: 0.54 MG/DL (ref 0.2–1)
BUN SERPL-MCNC: 4 MG/DL (ref 5–25)
CALCIUM ALBUM COR SERPL-MCNC: 8.6 MG/DL (ref 8.3–10.1)
CALCIUM SERPL-MCNC: 7.8 MG/DL (ref 8.4–10.2)
CHLORIDE SERPL-SCNC: 110 MMOL/L (ref 96–108)
CO2 SERPL-SCNC: 19 MMOL/L (ref 21–32)
CREAT SERPL-MCNC: 0.41 MG/DL (ref 0.6–1.3)
EOSINOPHIL # BLD AUTO: 0.01 THOUSAND/ÂΜL (ref 0–0.61)
EOSINOPHIL NFR BLD AUTO: 0 % (ref 0–6)
ERYTHROCYTE [DISTWIDTH] IN BLOOD BY AUTOMATED COUNT: 12.1 % (ref 11.6–15.1)
GFR SERPL CREATININE-BSD FRML MDRD: 139 ML/MIN/1.73SQ M
GLUCOSE SERPL-MCNC: 119 MG/DL (ref 65–140)
HCT VFR BLD AUTO: 30.6 % (ref 34.8–46.1)
HGB BLD-MCNC: 10.2 G/DL (ref 11.5–15.4)
IMM GRANULOCYTES # BLD AUTO: 0.05 THOUSAND/UL (ref 0–0.2)
IMM GRANULOCYTES NFR BLD AUTO: 0 % (ref 0–2)
LYMPHOCYTES # BLD AUTO: 2.7 THOUSANDS/ÂΜL (ref 0.6–4.47)
LYMPHOCYTES NFR BLD AUTO: 21 % (ref 14–44)
MAGNESIUM SERPL-MCNC: 1.9 MG/DL (ref 1.9–2.7)
MCH RBC QN AUTO: 29.8 PG (ref 26.8–34.3)
MCHC RBC AUTO-ENTMCNC: 33.3 G/DL (ref 31.4–37.4)
MCV RBC AUTO: 90 FL (ref 82–98)
MONOCYTES # BLD AUTO: 0.81 THOUSAND/ÂΜL (ref 0.17–1.22)
MONOCYTES NFR BLD AUTO: 6 % (ref 4–12)
NEUTROPHILS # BLD AUTO: 9.52 THOUSANDS/ÂΜL (ref 1.85–7.62)
NEUTS SEG NFR BLD AUTO: 73 % (ref 43–75)
NRBC BLD AUTO-RTO: 0 /100 WBCS
PHOSPHATE SERPL-MCNC: 3.5 MG/DL (ref 2.7–4.5)
PLATELET # BLD AUTO: 154 THOUSANDS/UL (ref 149–390)
PMV BLD AUTO: 11.1 FL (ref 8.9–12.7)
POTASSIUM SERPL-SCNC: 4 MMOL/L (ref 3.5–5.3)
PROT SERPL-MCNC: 5.2 G/DL (ref 6.4–8.4)
RBC # BLD AUTO: 3.42 MILLION/UL (ref 3.81–5.12)
SODIUM SERPL-SCNC: 137 MMOL/L (ref 135–147)
WBC # BLD AUTO: 13.13 THOUSAND/UL (ref 4.31–10.16)

## 2025-03-18 PROCEDURE — 85025 COMPLETE CBC W/AUTO DIFF WBC: CPT | Performed by: PHYSICIAN ASSISTANT

## 2025-03-18 PROCEDURE — 80053 COMPREHEN METABOLIC PANEL: CPT | Performed by: PHYSICIAN ASSISTANT

## 2025-03-18 PROCEDURE — 99239 HOSP IP/OBS DSCHRG MGMT >30: CPT | Performed by: FAMILY MEDICINE

## 2025-03-18 PROCEDURE — 83735 ASSAY OF MAGNESIUM: CPT | Performed by: PHYSICIAN ASSISTANT

## 2025-03-18 PROCEDURE — 99231 SBSQ HOSP IP/OBS SF/LOW 25: CPT | Performed by: FAMILY MEDICINE

## 2025-03-18 PROCEDURE — 84100 ASSAY OF PHOSPHORUS: CPT | Performed by: PHYSICIAN ASSISTANT

## 2025-03-18 RX ORDER — PYRIDOXINE HCL (VITAMIN B6) 25 MG
25 TABLET ORAL
Qty: 30 TABLET | Refills: 0 | Status: SHIPPED | OUTPATIENT
Start: 2025-03-18 | End: 2025-04-17

## 2025-03-18 RX ORDER — PANTOPRAZOLE SODIUM 20 MG/1
20 TABLET, DELAYED RELEASE ORAL DAILY
Qty: 30 TABLET | Refills: 0 | Status: SHIPPED | OUTPATIENT
Start: 2025-03-18 | End: 2025-04-17

## 2025-03-18 RX ORDER — PROMETHAZINE HYDROCHLORIDE 25 MG/1
12.5 SUPPOSITORY RECTAL EVERY 6 HOURS PRN
Status: DISCONTINUED | OUTPATIENT
Start: 2025-03-18 | End: 2025-03-18 | Stop reason: HOSPADM

## 2025-03-18 RX ORDER — METHYLPREDNISOLONE 16 MG/1
TABLET ORAL
Qty: 4 TABLET | Refills: 0 | Status: SHIPPED | OUTPATIENT
Start: 2025-03-18 | End: 2025-03-23

## 2025-03-18 RX ORDER — ONDANSETRON 4 MG/1
4 TABLET, ORALLY DISINTEGRATING ORAL EVERY 6 HOURS PRN
Qty: 15 TABLET | Refills: 0 | Status: SHIPPED | OUTPATIENT
Start: 2025-03-18 | End: 2025-03-20 | Stop reason: SDUPTHER

## 2025-03-18 RX ORDER — CEPHALEXIN 500 MG/1
500 CAPSULE ORAL EVERY 6 HOURS SCHEDULED
Qty: 24 CAPSULE | Refills: 0 | Status: SHIPPED | OUTPATIENT
Start: 2025-03-18 | End: 2025-03-25

## 2025-03-18 RX ORDER — PROMETHAZINE HYDROCHLORIDE 12.5 MG/1
12.5 SUPPOSITORY RECTAL EVERY 6 HOURS PRN
Qty: 12 EACH | Refills: 0 | Status: SHIPPED | OUTPATIENT
Start: 2025-03-18 | End: 2025-03-20 | Stop reason: SDUPTHER

## 2025-03-18 RX ADMIN — THIAMINE HYDROCHLORIDE 100 MG: 100 INJECTION, SOLUTION INTRAMUSCULAR; INTRAVENOUS at 08:18

## 2025-03-18 RX ADMIN — ONDANSETRON 4 MG: 2 INJECTION INTRAMUSCULAR; INTRAVENOUS at 12:22

## 2025-03-18 RX ADMIN — ONDANSETRON 4 MG: 2 INJECTION INTRAMUSCULAR; INTRAVENOUS at 06:00

## 2025-03-18 RX ADMIN — PANTOPRAZOLE SODIUM 40 MG: 40 INJECTION, POWDER, FOR SOLUTION INTRAVENOUS at 08:58

## 2025-03-18 RX ADMIN — METHYLPREDNISOLONE 16 MG: 4 TABLET ORAL at 08:17

## 2025-03-18 RX ADMIN — METHYLPREDNISOLONE 16 MG: 4 TABLET ORAL at 12:22

## 2025-03-18 RX ADMIN — DEXTROSE AND SODIUM CHLORIDE 125 ML/HR: 5; .9 INJECTION, SOLUTION INTRAVENOUS at 06:24

## 2025-03-18 RX ADMIN — ONDANSETRON 4 MG: 2 INJECTION INTRAMUSCULAR; INTRAVENOUS at 08:57

## 2025-03-18 RX ADMIN — CEFEPIME HYDROCHLORIDE 2000 MG: 2 INJECTION, SOLUTION INTRAVENOUS at 08:18

## 2025-03-18 RX ADMIN — ONDANSETRON 4 MG: 2 INJECTION INTRAMUSCULAR; INTRAVENOUS at 01:27

## 2025-03-18 NOTE — ASSESSMENT & PLAN NOTE
Patient with obstetric history G5, P4, currently 14-week EGA  Continue management of nausea and vomiting as above   Monitor fetal heart tones  OB/GYN consulted

## 2025-03-18 NOTE — ASSESSMENT & PLAN NOTE
Noted on admission labs with low TSH and normal T4   Likely transient in nature   Follow up outpatient with OBGYN for further management

## 2025-03-18 NOTE — ASSESSMENT & PLAN NOTE
Resolved  Increased anion gap of 18 carbon dioxide of 17 on admission   Continue PO hydration at home

## 2025-03-18 NOTE — ASSESSMENT & PLAN NOTE
Resolved  Increased anion gap of 18 carbon dioxide of 17 on admission   Continue IV fluid hydration

## 2025-03-18 NOTE — ASSESSMENT & PLAN NOTE
SIRS criteria 2/4 (tachycardia, leukocytosis)  UA with bacteria, likely UTI  Cefepime ordered   Maintain MAP above >65  Follow up blood and urine cultures   Repeat CBC, CMP, lactic acid in AM   Trend and replete electrolytes as indicated

## 2025-03-18 NOTE — PLAN OF CARE
Problem: PAIN - ADULT  Goal: Verbalizes/displays adequate comfort level or baseline comfort level  Description: Interventions:  - Encourage patient to monitor pain and request assistance  - Assess pain using appropriate pain scale  - Administer analgesics based on type and severity of pain and evaluate response  - Implement non-pharmacological measures as appropriate and evaluate response  - Consider cultural and social influences on pain and pain management  - Notify physician/advanced practitioner if interventions unsuccessful or patient reports new pain  Outcome: Progressing     Problem: INFECTION - ADULT  Goal: Absence or prevention of progression during hospitalization  Description: INTERVENTIONS:  - Assess and monitor for signs and symptoms of infection  - Monitor lab/diagnostic results  - Monitor all insertion sites, i.e. indwelling lines, tubes, and drains  - Monitor endotracheal if appropriate and nasal secretions for changes in amount and color  - [place] appropriate cooling/warming therapies per order  - Administer medications as ordered  - Instruct and encourage patient and family to use good hand hygiene technique  - Identify and instruct in appropriate isolation precautions for identified infection/condition  Outcome: Progressing  Goal: Absence of fever/infection during neutropenic period  Description: INTERVENTIONS:  - Monitor WBC    Outcome: Progressing     Problem: SAFETY ADULT  Goal: Patient will remain free of falls  Description: INTERVENTIONS:  - Educate patient/family on patient safety including physical limitations  - Instruct patient to call for assistance with activity   - Consult OT/PT to assist with strengthening/mobility   - Keep Call bell within reach  - Keep bed low and locked with side rails adjusted as appropriate  - Keep care items and personal belongings within reach  - Initiate and maintain comfort rounds  - Make Fall Risk Sign visible to staff  - Offer Toileting every 2 Hours,  in advance of need  - Initiate/Maintain bed alarm  - Obtain necessary fall risk management equipment:   - Apply yellow socks and bracelet for high fall risk patients  - Consider moving patient to room near nurses station  Outcome: Progressing  Goal: Maintain or return to baseline ADL function  Description: INTERVENTIONS:  -  Assess patient's ability to carry out ADLs; assess patient's baseline for ADL function and identify physical deficits which impact ability to perform ADLs (bathing, care of mouth/teeth, toileting, grooming, dressing, etc.)  - Assess/evaluate cause of self-care deficits   - Assess range of motion  - Assess patient's mobility; develop plan if impaired  - Assess patient's need for assistive devices and provide as appropriate  - Encourage maximum independence but intervene and supervise when necessary  - Involve family in performance of ADLs  - Assess for home care needs following discharge   - Consider OT consult to assist with ADL evaluation and planning for discharge  - Provide patient education as appropriate  Outcome: Progressing  Goal: Maintains/Returns to pre admission functional level  Description: INTERVENTIONS:  - Perform AM-PAC 6 Click Basic Mobility/ Daily Activity assessment daily.  - Set and communicate daily mobility goal to care team and patient/family/caregiver.   - Collaborate with rehabilitation services on mobility goals if consulted  - Perform Range of Motion 2 times a day.  - Reposition patient every 2 hours.  - Dangle patient 2 times a day  - Stand patient 2 times a day  - Ambulate patient 2 times a day  - Out of bed to chair 2 times a day   - Out of bed for meals 2 times a day  - Out of bed for toileting  - Record patient progress and toleration of activity level   Outcome: Progressing     Problem: DISCHARGE PLANNING  Goal: Discharge to home or other facility with appropriate resources  Description: INTERVENTIONS:  - Identify barriers to discharge w/patient and caregiver  -  Arrange for needed discharge resources and transportation as appropriate  - Identify discharge learning needs (meds, wound care, etc.)  - Arrange for interpretive services to assist at discharge as needed  - Refer to Case Management Department for coordinating discharge planning if the patient needs post-hospital services based on physician/advanced practitioner order or complex needs related to functional status, cognitive ability, or social support system  Outcome: Progressing     Problem: Knowledge Deficit  Goal: Patient/family/caregiver demonstrates understanding of disease process, treatment plan, medications, and discharge instructions  Description: Complete learning assessment and assess knowledge base.  Interventions:  - Provide teaching at level of understanding  - Provide teaching via preferred learning methods  Outcome: Progressing     Problem: CARDIOVASCULAR - ADULT  Goal: Maintains optimal cardiac output and hemodynamic stability  Description: INTERVENTIONS:  - Monitor I/O, vital signs and rhythm  - Monitor for S/S and trends of decreased cardiac output  - Administer and titrate ordered vasoactive medications to optimize hemodynamic stability  - Assess quality of pulses, skin color and temperature  - Assess for signs of decreased coronary artery perfusion  - Instruct patient to report change in severity of symptoms  Outcome: Progressing  Goal: Absence of cardiac dysrhythmias or at baseline rhythm  Description: INTERVENTIONS:  - Continuous cardiac monitoring, vital signs, obtain 12 lead EKG if ordered  - Administer antiarrhythmic and heart rate control medications as ordered  - Monitor electrolytes and administer replacement therapy as ordered  Outcome: Progressing     Problem: GASTROINTESTINAL - ADULT  Goal: Minimal or absence of nausea and/or vomiting  Description: INTERVENTIONS:  - Administer IV fluids if ordered to ensure adequate hydration  - Maintain NPO status until nausea and vomiting are  resolved  - Nasogastric tube if ordered  - Administer ordered antiemetic medications as needed  - Provide nonpharmacologic comfort measures as appropriate  - Advance diet as tolerated, if ordered  - Consider nutrition services referral to assist patient with adequate nutrition and appropriate food choices  Outcome: Progressing  Goal: Maintains or returns to baseline bowel function  Description: INTERVENTIONS:  - Assess bowel function  - Encourage oral fluids to ensure adequate hydration  - Administer IV fluids if ordered to ensure adequate hydration  - Administer ordered medications as needed  - Encourage mobilization and activity  - Consider nutritional services referral to assist patient with adequate nutrition and appropriate food choices  Outcome: Progressing  Goal: Maintains adequate nutritional intake  Description: INTERVENTIONS:  - Monitor percentage of each meal consumed  - Identify factors contributing to decreased intake, treat as appropriate  - Assist with meals as needed  - Monitor I&O, weight, and lab values if indicated  - Obtain nutrition services referral as needed  Outcome: Progressing  Goal: Establish and maintain optimal ostomy function  Description: INTERVENTIONS:  - Assess bowel function  - Encourage oral fluids to ensure adequate hydration  - Administer IV fluids if ordered to ensure adequate hydration   - Administer ordered medications as needed  - Encourage mobilization and activity  - Nutrition services referral to assist patient with appropriate food choices  - Assess stoma site  - Consider wound care consult   Outcome: Progressing  Goal: Oral mucous membranes remain intact  Description: INTERVENTIONS  - Assess oral mucosa and hygiene practices  - Implement preventative oral hygiene regimen  - Implement oral medicated treatments as ordered  - Initiate Nutrition services referral as needed  Outcome: Progressing     Problem: METABOLIC, FLUID AND ELECTROLYTES - ADULT  Goal: Electrolytes  maintained within normal limits  Description: INTERVENTIONS:  - Monitor labs and assess patient for signs and symptoms of electrolyte imbalances  - Administer electrolyte replacement as ordered  - Monitor response to electrolyte replacements, including repeat lab results as appropriate  - Instruct patient on fluid and nutrition as appropriate  Outcome: Progressing  Goal: Fluid balance maintained  Description: INTERVENTIONS:  - Monitor labs   - Monitor I/O and WT  - Instruct patient on fluid and nutrition as appropriate  - Assess for signs & symptoms of volume excess or deficit  Outcome: Progressing  Goal: Glucose maintained within target range  Description: INTERVENTIONS:  - Monitor Blood Glucose as ordered  - Assess for signs and symptoms of hyperglycemia and hypoglycemia  - Administer ordered medications to maintain glucose within target range  - Assess nutritional intake and initiate nutrition service referral as needed  Outcome: Progressing     Problem: Prexisting or High Potential for Compromised Skin Integrity  Goal: Skin integrity is maintained or improved  Description: INTERVENTIONS:  - Identify patients at risk for skin breakdown  - Assess and monitor skin integrity  - Assess and monitor nutrition and hydration status  - Monitor labs   - Assess for incontinence   - Turn and reposition patient  - Assist with mobility/ambulation  - Relieve pressure over bony prominences  - Avoid friction and shearing  - Provide appropriate hygiene as needed including keeping skin clean and dry  - Evaluate need for skin moisturizer/barrier cream  - Collaborate with interdisciplinary team   - Patient/family teaching  - Consider wound care consult   Outcome: Progressing

## 2025-03-18 NOTE — ASSESSMENT & PLAN NOTE
Noted on UA admission   Possible source of N/V or exacerbating baseline issue   Cefepime ordered for admission   Keflex ordered for d/c   Follow up with OBGYN outpatient

## 2025-03-18 NOTE — DISCHARGE SUMMARY
Discharge Summary - Hospitalist   Name: Hayley Brown 30 y.o. female I MRN: [id number]  Unit/Bed#:  I Date of Admission: 3/17/2025   Date of Service: 3/18/2025 I Hospital Day: 0     Assessment & Plan  Hyperemesis gravidarum  Presented to the ER for evaluation of nausea and vomiting. Concerning for  hyperemesis gravidarum, patient is currently 14 EGA in the setting of hypokalemia, elevated liver enzymes, ketones in urine.     Improving   Continue steroid taper outpatient, ordered for discharge   Zofran, protonix, Unisom, and B6 ordered for discharge   Recommend continuing pre-ruben vitamin   Recommend adequate hydration at home   Recommend bland diet at home until able to get back to baseline diet   Information provided for [facility], as patient wants to switch providers     Acute cystitis without hematuria  Noted on UA admission   Possible source of N/V or exacerbating baseline issue   Cefepime ordered for admission   Keflex ordered for d/c   Follow up with OBGYN outpatient   Hypokalemia  Resolved  Potassium level 3.4 on admission   S/p replacement during admission   Continue prenatal vitamin at home   Increased anion gap metabolic acidosis  Resolved  Increased anion gap of 18 carbon dioxide of 17 on admission   Continue PO hydration at home     SIRS (systemic inflammatory response syndrome) (HCC)  Resolved, likely combination of hyperemesis and UTI   Met with SIRS criteria 2/4 (tachycardia, leukocytosis) on admission   UA with bacteria, likely UTI  Cefepime ordered   Urine culture with mixed contaminants   Keflex ordered for discharge     14 weeks gestation of pregnancy  Patient with obstetric history G5, P4, currently 14-week EGA  Continue management of nausea and vomiting as above   Monitor fetal heart tones- 145 bpm on day of discharge   Follow up outpatient with OBGYN     Subclinical hyperthyroidism  Noted on admission labs with low TSH and normal T4   Likely transient in  nature   Follow up outpatient with OBGYN for further management      Medical Problems       Resolved Problems  Date Reviewed: 3/17/2025   None       Discharging Physician / Practitioner: Emma Cruz DO  PCP: No primary care provider on file.  Admission Date:   Admission Orders (From admission, onward)       Ordered        03/17/25 0519  Place in Observation  Once                          Discharge Date: 03/18/25    Consultations During Hospital Stay:  OBGYN    Procedures Performed:   No orders to display        Significant Findings / Test Results:   Results for orders placed or performed during the hospital encounter of 03/17/25   Urine culture    Specimen: Urine, Clean Catch   Result Value Ref Range    Urine Culture <10,000 cfu/ml    Blood culture    Specimen: Arm, Right; Blood   Result Value Ref Range    Blood Culture Received in Microbiology Lab. Culture in Progress.    Blood culture    Specimen: Arm, Left; Blood   Result Value Ref Range    Blood Culture Received in Microbiology Lab. Culture in Progress.    Comprehensive metabolic panel   Result Value Ref Range    Sodium 141 135 - 147 mmol/L    Potassium 3.4 (L) 3.5 - 5.3 mmol/L    Chloride 106 96 - 108 mmol/L    CO2 17 (L) 21 - 32 mmol/L    ANION GAP 18 (H) 4 - 13 mmol/L    BUN 9 5 - 25 mg/dL    Creatinine 0.58 (L) 0.60 - 1.30 mg/dL    Glucose 124 65 - 140 mg/dL    Calcium 10.0 8.4 - 10.2 mg/dL    AST 31 13 - 39 U/L    ALT 53 (H) 7 - 52 U/L    Alkaline Phosphatase 47 34 - 104 U/L    Total Protein 7.6 6.4 - 8.4 g/dL    Albumin 4.4 3.5 - 5.0 g/dL    Total Bilirubin 0.81 0.20 - 1.00 mg/dL    eGFR 124 ml/min/1.73sq m   Lipase   Result Value Ref Range    Lipase 37 11 - 82 u/L   CBC and differential   Result Value Ref Range    WBC 15.67 (H) 4.31 - 10.16 Thousand/uL    RBC 5.02 3.81 - 5.12 Million/uL    Hemoglobin 14.9 11.5 - 15.4 g/dL    Hematocrit 43.7 34.8 - 46.1 %    MCV 87 82 - 98 fL    MCH 29.7 26.8 - 34.3 pg    MCHC 34.1 31.4 - 37.4 g/dL    RDW 11.9 11.6 -  15.1 %    MPV 11.4 8.9 - 12.7 fL    Platelets 258 149 - 390 Thousands/uL    nRBC 0 /100 WBCs    Segmented % 78 (H) 43 - 75 %    Immature Grans % 0 0 - 2 %    Lymphocytes % 17 14 - 44 %    Monocytes % 4 4 - 12 %    Eosinophils Relative 1 0 - 6 %    Basophils Relative 0 0 - 1 %    Absolute Neutrophils 11.97 (H) 1.85 - 7.62 Thousands/µL    Absolute Immature Grans 0.04 0.00 - 0.20 Thousand/uL    Absolute Lymphocytes 2.73 0.60 - 4.47 Thousands/µL    Absolute Monocytes 0.68 0.17 - 1.22 Thousand/µL    Eosinophils Absolute 0.21 0.00 - 0.61 Thousand/µL    Basophils Absolute 0.04 0.00 - 0.10 Thousands/µL   Phosphorus   Result Value Ref Range    Phosphorus 3.9 2.7 - 4.5 mg/dL   Magnesium   Result Value Ref Range    Magnesium 1.9 1.9 - 2.7 mg/dL   Beta Hydroxybutyrate   Result Value Ref Range    Beta- Hydroxybutyrate 2.69 (H) 0.02 - 0.27 mmol/L   UA w Reflex to Microscopic w Reflex to Culture    Specimen: Urine, Clean Catch   Result Value Ref Range    Color, UA Yellow     Clarity, UA Slightly Cloudy     Specific Gravity, UA >=1.030 1.005 - 1.030    pH, UA 5.5 4.5, 5.0, 5.5, 6.0, 6.5, 7.0, 7.5, 8.0    Leukocytes, UA Large (A) Negative    Nitrite, UA Negative Negative    Protein, UA 30 (1+) (A) Negative mg/dl    Glucose, UA 70 (7/100%) (A) Negative mg/dl    Ketones, UA 10 (1+) (A) Negative mg/dl    Urobilinogen, UA 4.0 (A) <2.0 mg/dl mg/dl    Bilirubin, UA Small (A) Negative    Occult Blood, UA Moderate (A) Negative    URINE COMMENT     TSH, 3rd generation   Result Value Ref Range    TSH 3RD GENERATON <0.010 (L) 0.450 - 4.500 uIU/mL   Procalcitonin   Result Value Ref Range    Procalcitonin <0.05 <=0.25 ng/ml   Blood gas, venous   Result Value Ref Range    pH, Ortega 7.366 7.300 - 7.400    pCO2, Ortega 38.8 (L) 42.0 - 50.0 mm Hg    pO2, Ortega 56.2 (H) 35.0 - 45.0 mm Hg    HCO3, Ortega 21.7 (L) 24 - 30 mmol/L    Base Excess, Ortega -3.2 mmol/L    O2 Content, Ortega 16.9 ml/dL    O2 HGB, VENOUS 86.4 (H) 60.0 - 80.0 %    Nasal Cannula room air     Urine Microscopic   Result Value Ref Range    RBC, UA 4-10 (A) None Seen, 0-1, 1-2, 2-4, 0-5 /hpf    WBC, UA 10-20 (A) None Seen, 0-1, 1-2, 0-5, 2-4 /hpf    Epithelial Cells Moderate (A) None Seen, Occasional /hpf    Bacteria, UA Moderate (A) None Seen, Occasional /hpf    MUCUS THREADS Moderate (A) None Seen    URINE COMMENT     Lactic acid, plasma (w/reflex if result > 2.0)   Result Value Ref Range    LACTIC ACID 0.9 0.5 - 2.0 mmol/L   T4, free   Result Value Ref Range    Free T4 1.05 0.61 - 1.12 ng/dL   Basic metabolic panel   Result Value Ref Range    Sodium 137 135 - 147 mmol/L    Potassium 3.4 (L) 3.5 - 5.3 mmol/L    Chloride 109 (H) 96 - 108 mmol/L    CO2 22 21 - 32 mmol/L    ANION GAP 6 4 - 13 mmol/L    BUN 6 5 - 25 mg/dL    Creatinine 0.54 (L) 0.60 - 1.30 mg/dL    Glucose 158 (H) 65 - 140 mg/dL    Calcium 8.7 8.4 - 10.2 mg/dL    eGFR 127 ml/min/1.73sq m   Comprehensive metabolic panel   Result Value Ref Range    Sodium 137 135 - 147 mmol/L    Potassium 4.0 3.5 - 5.3 mmol/L    Chloride 110 (H) 96 - 108 mmol/L    CO2 19 (L) 21 - 32 mmol/L    ANION GAP 8 4 - 13 mmol/L    BUN 4 (L) 5 - 25 mg/dL    Creatinine 0.41 (L) 0.60 - 1.30 mg/dL    Glucose 119 65 - 140 mg/dL    Calcium 7.8 (L) 8.4 - 10.2 mg/dL    Corrected Calcium 8.6 8.3 - 10.1 mg/dL    AST 27 13 - 39 U/L    ALT 35 7 - 52 U/L    Alkaline Phosphatase 29 (L) 34 - 104 U/L    Total Protein 5.2 (L) 6.4 - 8.4 g/dL    Albumin 3.0 (L) 3.5 - 5.0 g/dL    Total Bilirubin 0.54 0.20 - 1.00 mg/dL    eGFR 139 ml/min/1.73sq m   Magnesium   Result Value Ref Range    Magnesium 1.9 1.9 - 2.7 mg/dL   Phosphorus   Result Value Ref Range    Phosphorus 3.5 2.7 - 4.5 mg/dL   CBC and differential   Result Value Ref Range    WBC 13.13 (H) 4.31 - 10.16 Thousand/uL    RBC 3.42 (L) 3.81 - 5.12 Million/uL    Hemoglobin 10.2 (L) 11.5 - 15.4 g/dL    Hematocrit 30.6 (L) 34.8 - 46.1 %    MCV 90 82 - 98 fL    MCH 29.8 26.8 - 34.3 pg    MCHC 33.3 31.4 - 37.4 g/dL    RDW 12.1 11.6 - 15.1 %     MPV 11.1 8.9 - 12.7 fL    Platelets 154 149 - 390 Thousands/uL    nRBC 0 /100 WBCs    Segmented % 73 43 - 75 %    Immature Grans % 0 0 - 2 %    Lymphocytes % 21 14 - 44 %    Monocytes % 6 4 - 12 %    Eosinophils Relative 0 0 - 6 %    Basophils Relative 0 0 - 1 %    Absolute Neutrophils 9.52 (H) 1.85 - 7.62 Thousands/µL    Absolute Immature Grans 0.05 0.00 - 0.20 Thousand/uL    Absolute Lymphocytes 2.70 0.60 - 4.47 Thousands/µL    Absolute Monocytes 0.81 0.17 - 1.22 Thousand/µL    Eosinophils Absolute 0.01 0.00 - 0.61 Thousand/µL    Basophils Absolute 0.04 0.00 - 0.10 Thousands/µL       Incidental Findings:   None     Test Results Pending at Discharge (will require follow up):   None     Outpatient Tests Requested:  None    Complications:  None    Reason for Admission: Hyperemesis     Hospital Course:   Hayley Brown is a 30 y.o. female patient who originally presented to the hospital on 3/17/2025 due to vomiting. Please see hospitalist H&P for further details.      Patient moved to 2nd floor med surg for further management of care. Started on fluids, anti-emetics, and steroids for hyperemesis. NPO on admission. UA came back positive for bacteria in the urine. Cefepime was started for UTI causing SIRS. After receiving medications and controlling symptoms, patient was able to advance diet slowly. OBGYN was consulted and adjusted antiemetic regimen. On the second day of admission, patient was able to tolerate full oral diet. Fetal Heart rate was 145. All labs, including elevated liver enzymes and hypokalemia, had normalized. Vital signs stable and patient saturating well on room air. Discharge medications discussed with OBGYN, Dr. George, prior to ordering.     The patient's medical conditions can be managed on an outpatient basis. She will be discharged to home with six days of keflex, steroid taper, B6, Zofran, Unisom, and protonix. Patient can follow-up outpatient with PCP and OBGYN for further management.      Please see above list of diagnoses and related plan for additional information.     Condition at Discharge: stable    Discharge Day Visit / Exam:   * Please refer to separate progress note for these details *    Discussion with Family: Patient declined call to .     Discharge instructions/Information to patient and family:   See after visit summary for information provided to patient and family.      Provisions for Follow-Up Care:  See after visit summary for information related to follow-up care and any pertinent home health orders.      Mobility at time of Discharge:   Basic Mobility Inpatient Raw Score: 22  JH-HLM Goal: 7: Walk 25 feet or more  JH-HLM Achieved: 7: Walk 25 feet or more  HLM Goal achieved. Continue to encourage appropriate mobility.     Disposition:   Home    Planned Readmission: No    Discharge Medications:  See after visit summary for reconciled discharge medications provided to patient and/or family.      Administrative Statements     Emma Cruz DO   PGY-2 [employer] [facility]   [facility]     **Please Note: This note may have been constructed using a voice recognition system**

## 2025-03-18 NOTE — NURSING NOTE
AVS printed and reviewed with patient.  Patient verbalized understanding.  No home care ordered.  Patient was discharged from the floor in no acute distress.

## 2025-03-18 NOTE — ASSESSMENT & PLAN NOTE
Resolved  Potassium level 3.4 on admission   S/p replacement during admission   Continue prenatal vitamin at home

## 2025-03-18 NOTE — ASSESSMENT & PLAN NOTE
Patient with obstetric history G5, P4, currently 14-week EGA  Continue management of nausea and vomiting as above   Monitor fetal heart tones- 145 bpm on day of discharge   Follow up outpatient with OBGYN

## 2025-03-18 NOTE — CASE MANAGEMENT
Case Management Discharge Planning Note    Patient name Hayley Brown  Location / MRN [id number]  : 1995 Date 3/18/2025       Current Admission Date: 3/17/2025  Current Admission Diagnosis:Hyperemesis gravidarum   Patient Active Problem List    Diagnosis Date Noted Date Diagnosed    Acute cystitis without hematuria 2025     Subclinical hyperthyroidism 2025     Hypokalemia 2025     Increased anion gap metabolic acidosis 2025     Hyperemesis gravidarum 2025     SIRS (systemic inflammatory response syndrome) (HCC) 2025     14 weeks gestation of pregnancy 2025       LOS (days): 0  Geometric Mean LOS (GMLOS) (days):   Days to GMLOS:     OBJECTIVE:            Current admission status: Observation   Preferred Pharmacy:   [id number] - [place] - [address]  [address] [address]  Phone: [phone] Fax: [phone]    Primary Care Provider: No primary care provider on file.    Primary Insurance: [employer]  Secondary Insurance:     DISCHARGE DETAILS:           Patient stable for discharge home with outpatient medical follow up.  Follow up providers listed on AVS.  Family to transport patient home.   New appt [facility] PCP 3/20/25 at 10:20 am.

## 2025-03-18 NOTE — PLAN OF CARE
Problem: PAIN - ADULT  Goal: Verbalizes/displays adequate comfort level or baseline comfort level  Description: Interventions:  - Encourage patient to monitor pain and request assistance  - Assess pain using appropriate pain scale  - Administer analgesics based on type and severity of pain and evaluate response  - Implement non-pharmacological measures as appropriate and evaluate response  - Consider cultural and social influences on pain and pain management  - Notify physician/advanced practitioner if interventions unsuccessful or patient reports new pain  3/18/2025 1549 by Selin Chang RN  Outcome: Completed  3/18/2025 0724 by Selin Chang RN  Outcome: Progressing     Problem: INFECTION - ADULT  Goal: Absence or prevention of progression during hospitalization  Description: INTERVENTIONS:  - Assess and monitor for signs and symptoms of infection  - Monitor lab/diagnostic results  - Monitor all insertion sites, i.e. indwelling lines, tubes, and drains  - Monitor endotracheal if appropriate and nasal secretions for changes in amount and color  - [place] appropriate cooling/warming therapies per order  - Administer medications as ordered  - Instruct and encourage patient and family to use good hand hygiene technique  - Identify and instruct in appropriate isolation precautions for identified infection/condition  3/18/2025 1549 by Selin Chang RN  Outcome: Completed  3/18/2025 0724 by Selin Chang RN  Outcome: Progressing  Goal: Absence of fever/infection during neutropenic period  Description: INTERVENTIONS:  - Monitor WBC    3/18/2025 1549 by Selin Chang RN  Outcome: Completed  3/18/2025 0724 by Selin Chang RN  Outcome: Progressing     Problem: SAFETY ADULT  Goal: Patient will remain free of falls  Description: INTERVENTIONS:  - Educate patient/family on patient safety including physical limitations  - Instruct patient to call for assistance with activity   - Consult OT/PT to assist  with strengthening/mobility   - Keep Call bell within reach  - Keep bed low and locked with side rails adjusted as appropriate  - Keep care items and personal belongings within reach  - Initiate and maintain comfort rounds  - Make Fall Risk Sign visible to staff  - Offer Toileting every 2 Hours, in advance of need  - Initiate/Maintain bed alarm  - Obtain necessary fall risk management equipment:   - Apply yellow socks and bracelet for high fall risk patients  - Consider moving patient to room near nurses station  3/18/2025 1549 by Selin Chang RN  Outcome: Completed  3/18/2025 0724 by Selin Chang RN  Outcome: Progressing  Goal: Maintain or return to baseline ADL function  Description: INTERVENTIONS:  -  Assess patient's ability to carry out ADLs; assess patient's baseline for ADL function and identify physical deficits which impact ability to perform ADLs (bathing, care of mouth/teeth, toileting, grooming, dressing, etc.)  - Assess/evaluate cause of self-care deficits   - Assess range of motion  - Assess patient's mobility; develop plan if impaired  - Assess patient's need for assistive devices and provide as appropriate  - Encourage maximum independence but intervene and supervise when necessary  - Involve family in performance of ADLs  - Assess for home care needs following discharge   - Consider OT consult to assist with ADL evaluation and planning for discharge  - Provide patient education as appropriate  3/18/2025 1549 by Selin Chang RN  Outcome: Completed  3/18/2025 0724 by Selin Chang RN  Outcome: Progressing  Goal: Maintains/Returns to pre admission functional level  Description: INTERVENTIONS:  - Perform AM-PAC 6 Click Basic Mobility/ Daily Activity assessment daily.  - Set and communicate daily mobility goal to care team and patient/family/caregiver.   - Collaborate with rehabilitation services on mobility goals if consulted  - Perform Range of Motion 2 times a day.  - Reposition patient  every 2 hours.  - Dangle patient 2 times a day  - Stand patient 2 times a day  - Ambulate patient 2 times a day  - Out of bed to chair 2 times a day   - Out of bed for meals 2 times a day  - Out of bed for toileting  - Record patient progress and toleration of activity level   3/18/2025 1549 by Selin Chang RN  Outcome: Completed  3/18/2025 0724 by Selin Chang RN  Outcome: Progressing     Problem: DISCHARGE PLANNING  Goal: Discharge to home or other facility with appropriate resources  Description: INTERVENTIONS:  - Identify barriers to discharge w/patient and caregiver  - Arrange for needed discharge resources and transportation as appropriate  - Identify discharge learning needs (meds, wound care, etc.)  - Arrange for interpretive services to assist at discharge as needed  - Refer to Case Management Department for coordinating discharge planning if the patient needs post-hospital services based on physician/advanced practitioner order or complex needs related to functional status, cognitive ability, or social support system  3/18/2025 1549 by Selin Chang RN  Outcome: Completed  3/18/2025 0724 by Selin Chang RN  Outcome: Progressing     Problem: Knowledge Deficit  Goal: Patient/family/caregiver demonstrates understanding of disease process, treatment plan, medications, and discharge instructions  Description: Complete learning assessment and assess knowledge base.  Interventions:  - Provide teaching at level of understanding  - Provide teaching via preferred learning methods  3/18/2025 1549 by Selin Chang RN  Outcome: Completed  3/18/2025 0724 by Selin Chang RN  Outcome: Progressing     Problem: CARDIOVASCULAR - ADULT  Goal: Maintains optimal cardiac output and hemodynamic stability  Description: INTERVENTIONS:  - Monitor I/O, vital signs and rhythm  - Monitor for S/S and trends of decreased cardiac output  - Administer and titrate ordered vasoactive medications to optimize hemodynamic  stability  - Assess quality of pulses, skin color and temperature  - Assess for signs of decreased coronary artery perfusion  - Instruct patient to report change in severity of symptoms  3/18/2025 1549 by Selin Chang RN  Outcome: Completed  3/18/2025 0724 by Selin Chang RN  Outcome: Progressing  Goal: Absence of cardiac dysrhythmias or at baseline rhythm  Description: INTERVENTIONS:  - Continuous cardiac monitoring, vital signs, obtain 12 lead EKG if ordered  - Administer antiarrhythmic and heart rate control medications as ordered  - Monitor electrolytes and administer replacement therapy as ordered  3/18/2025 1549 by Selin Chang RN  Outcome: Completed  3/18/2025 0724 by Selin Chang RN  Outcome: Progressing     Problem: GASTROINTESTINAL - ADULT  Goal: Minimal or absence of nausea and/or vomiting  Description: INTERVENTIONS:  - Administer IV fluids if ordered to ensure adequate hydration  - Maintain NPO status until nausea and vomiting are resolved  - Nasogastric tube if ordered  - Administer ordered antiemetic medications as needed  - Provide nonpharmacologic comfort measures as appropriate  - Advance diet as tolerated, if ordered  - Consider nutrition services referral to assist patient with adequate nutrition and appropriate food choices  3/18/2025 1549 by Selin Chang RN  Outcome: Completed  3/18/2025 0724 by Selin Chang RN  Outcome: Progressing  Goal: Maintains or returns to baseline bowel function  Description: INTERVENTIONS:  - Assess bowel function  - Encourage oral fluids to ensure adequate hydration  - Administer IV fluids if ordered to ensure adequate hydration  - Administer ordered medications as needed  - Encourage mobilization and activity  - Consider nutritional services referral to assist patient with adequate nutrition and appropriate food choices  3/18/2025 1549 by Selin Chang RN  Outcome: Completed  3/18/2025 0724 by Selin Chang RN  Outcome:  Progressing  Goal: Maintains adequate nutritional intake  Description: INTERVENTIONS:  - Monitor percentage of each meal consumed  - Identify factors contributing to decreased intake, treat as appropriate  - Assist with meals as needed  - Monitor I&O, weight, and lab values if indicated  - Obtain nutrition services referral as needed  3/18/2025 1549 by Selin Chang RN  Outcome: Completed  3/18/2025 0724 by Selin Chang RN  Outcome: Progressing  Goal: Establish and maintain optimal ostomy function  Description: INTERVENTIONS:  - Assess bowel function  - Encourage oral fluids to ensure adequate hydration  - Administer IV fluids if ordered to ensure adequate hydration   - Administer ordered medications as needed  - Encourage mobilization and activity  - Nutrition services referral to assist patient with appropriate food choices  - Assess stoma site  - Consider wound care consult   3/18/2025 1549 by Selin Chang RN  Outcome: Completed  3/18/2025 0724 by Selin Chang RN  Outcome: Progressing  Goal: Oral mucous membranes remain intact  Description: INTERVENTIONS  - Assess oral mucosa and hygiene practices  - Implement preventative oral hygiene regimen  - Implement oral medicated treatments as ordered  - Initiate Nutrition services referral as needed  3/18/2025 1549 by Selin Chang RN  Outcome: Completed  3/18/2025 0724 by Selin Chang RN  Outcome: Progressing     Problem: METABOLIC, FLUID AND ELECTROLYTES - ADULT  Goal: Electrolytes maintained within normal limits  Description: INTERVENTIONS:  - Monitor labs and assess patient for signs and symptoms of electrolyte imbalances  - Administer electrolyte replacement as ordered  - Monitor response to electrolyte replacements, including repeat lab results as appropriate  - Instruct patient on fluid and nutrition as appropriate  3/18/2025 1549 by Selin Chang RN  Outcome: Completed  3/18/2025 0724 by Selin Chang RN  Outcome: Progressing  Goal:  Fluid balance maintained  Description: INTERVENTIONS:  - Monitor labs   - Monitor I/O and WT  - Instruct patient on fluid and nutrition as appropriate  - Assess for signs & symptoms of volume excess or deficit  3/18/2025 1549 by Selin Chang RN  Outcome: Completed  3/18/2025 0724 by Selin Chang RN  Outcome: Progressing  Goal: Glucose maintained within target range  Description: INTERVENTIONS:  - Monitor Blood Glucose as ordered  - Assess for signs and symptoms of hyperglycemia and hypoglycemia  - Administer ordered medications to maintain glucose within target range  - Assess nutritional intake and initiate nutrition service referral as needed  3/18/2025 1549 by Selin Chang RN  Outcome: Completed  3/18/2025 0724 by Selin Chang RN  Outcome: Progressing     Problem: Prexisting or High Potential for Compromised Skin Integrity  Goal: Skin integrity is maintained or improved  Description: INTERVENTIONS:  - Identify patients at risk for skin breakdown  - Assess and monitor skin integrity  - Assess and monitor nutrition and hydration status  - Monitor labs   - Assess for incontinence   - Turn and reposition patient  - Assist with mobility/ambulation  - Relieve pressure over bony prominences  - Avoid friction and shearing  - Provide appropriate hygiene as needed including keeping skin clean and dry  - Evaluate need for skin moisturizer/barrier cream  - Collaborate with interdisciplinary team   - Patient/family teaching  - Consider wound care consult   3/18/2025 1549 by Selin Chang RN  Outcome: Completed  3/18/2025 0724 by Selin Chang RN  Outcome: Progressing

## 2025-03-18 NOTE — DISCHARGE INSTR - AVS FIRST PAGE
Pregnancy at 15 to 18 Weeks   WHAT YOU NEED TO KNOW:   What changes are happening in my body?  Now that you are in your second trimester, you have more energy. You may also feel hungrier than usual. You may start to experience other symptoms, such as heartburn or dizziness. You may be gaining about ½ to 1 pound a week, and your pregnancy is beginning to show. You may need to start wearing maternity clothes.  How do I care for myself at this stage of my pregnancy?       Manage heartburn  by eating 4 or 5 small meals each day instead of large meals. Avoid spicy foods. Avoid eating right before bedtime.         Manage nausea and vomiting.  Avoid fatty and spicy foods. Eat small meals throughout the day instead of large meals. Celeste may help to decrease nausea. Ask your healthcare provider about other ways of decreasing nausea and vomiting.    Eat a variety of healthy foods.  Healthy foods include fruits, vegetables, whole-grain breads, low-fat dairy foods, beans, lean meats, and fish. Drink liquids as directed. Ask how much liquid to drink each day and which liquids are best for you. Limit caffeine to less than 200 milligrams each day. Limit your intake of fish to 2 servings each week. Choose fish low in mercury such as canned light tuna, shrimp, salmon, cod, or tilapia. Do not  eat fish high in mercury such as swordfish, tilefish, maranda mackerel, and shark.         Take prenatal vitamins as directed.  Your need for certain vitamins and minerals, such as folic acid, increases during pregnancy. Prenatal vitamins provide some of the extra vitamins and minerals you need. Prenatal vitamins may also help to decrease the risk of certain birth defects.         Do not smoke.  Smoking increases your risk of a miscarriage and other health problems during your pregnancy. Smoking can cause your baby to be born too early or weigh less at birth. Ask your healthcare provider for information if you need help quitting.    Do not drink  alcohol.  Alcohol passes from your body to your baby through the placenta. It can affect your baby's brain development and cause fetal alcohol syndrome (FAS). FAS is a group of conditions that causes mental, behavior, and growth problems.    Talk to your healthcare provider before you take any medicines.  Many medicines may harm your baby if you take them when you are pregnant. Do not take any medicines, vitamins, herbs, or supplements without first talking to your healthcare provider. Never use illegal or street drugs (such as marijuana or cocaine) while you are pregnant.    What are some safety tips during pregnancy?   Avoid hot tubs and saunas.  Do not use a hot tub or sauna while you are pregnant, especially during your first trimester. Hot tubs and saunas may raise your baby's temperature and increase the risk of birth defects.    Avoid toxoplasmosis.  This is an infection caused by eating raw meat or being around infected cat feces. It can cause birth defects, miscarriages, and other problems. Wash your hands after you touch raw meat. Make sure any meat is well-cooked before you eat it. Avoid raw eggs and unpasteurized milk. Use gloves or ask someone else to clean your cat's litter box while you are pregnant.    What changes are happening with my baby?  By 18 weeks, your baby may be about 6 inches long from the top of the head to the rump (baby's bottom). Your baby may weigh about 11 ounces. You may be able to feel your baby's movement at about 18 weeks or later. The first movements may not be that noticeable. They may feel like a fluttering sensation. Your baby also makes sucking movements and can hear certain sounds.  What do I need to know about prenatal care?  During the first 28 weeks of your pregnancy, you will see your healthcare provider once a month. Your healthcare provider will check your blood pressure and weight. You may also need any of the following:  A urine test  may also be done to check for  sugar and protein. These can be signs of gestational diabetes or infection.    A blood test  may be done to check for anemia (low iron level).    Fundal height check  is a measurement of your uterus to check your baby's growth. This number is usually the same as the number of weeks that you have been pregnant.    An ultrasound  may be done to check your baby's development. Your healthcare provider may be able to tell you what your baby's gender is during the ultrasound.         Your baby's heart rate  will be checked.    When should I seek immediate care?   You have pain or cramping in your abdomen or low back.    You have heavy vaginal bleeding or clotting.    You pass material that looks like tissue or large clots. Collect the material and bring it with you.    When should I call my doctor or obstetrician?   You cannot keep food or drinks down, and you are losing weight.    You have light bleeding.    You have chills or a fever.    You have vaginal itching, burning, or pain.    You have yellow, green, white, or foul-smelling vaginal discharge.    You have pain or burning when you urinate, less urine than usual, or pink or bloody urine.    You have questions or concerns about your condition or care.    CARE AGREEMENT:   You have the right to help plan your care. Learn about your health condition and how it may be treated. Discuss treatment options with your healthcare providers to decide what care you want to receive. You always have the right to refuse treatment. The above information is an  only. It is not intended as medical advice for individual conditions or treatments. Talk to your doctor, nurse or pharmacist before following any medical regimen to see if it is safe and effective for you.  © Copyright [employer] 2022 Information is for End User's use only and may not be sold, redistributed or otherwise used for commercial purposes. All illustrations and images included in CareNotes® are  the copyrighted property of [email or web address] or [employer]

## 2025-03-18 NOTE — ASSESSMENT & PLAN NOTE
Presented to the ER for evaluation of nausea and vomiting. Concerning for  hyperemesis gravidarum, patient is currently 14 EGA in the setting of hypokalemia, elevated liver enzymes, ketones in urine.     Improving   Continue steroid taper outpatient, ordered for discharge   Zofran, protonix, Unisom, and B6 ordered for discharge   Recommend continuing pre-ruben vitamin   Recommend adequate hydration at home   Recommend bland diet at home until able to get back to baseline diet   Information provided for [facility] office, as patient wants to switch providers

## 2025-03-18 NOTE — ASSESSMENT & PLAN NOTE
Presented to the ER for evaluation of nausea and vomiting. Concerning for  hyperemesis gravidarum, patient is currently 14 EGA in the setting of hypokalemia, elevated liver enzymes, ketones in urine.     Improving   Continue Solu-Medrol taper as indicated   Zofran, protonix, B6 scheduled   Unisom, Reglan, phenergan PRN worsening nausea   Thiamine ordered   D5W ordered   Monitor hemodynamic status, electrolytes  Advance diet as tolerated   OB/GYN consulted

## 2025-03-18 NOTE — PROGRESS NOTES
Progress Note - Hospitalist   Name: Hayley Brown 30 y.o. female I MRN: [id number]  Unit/Bed#: [facility] I Date of Admission: 3/17/2025   Date of Service: 3/18/2025 I Hospital Day: 0    Assessment & Plan  Hyperemesis gravidarum  Presented to the ER for evaluation of nausea and vomiting. Concerning for  hyperemesis gravidarum, patient is currently 14 EGA in the setting of hypokalemia, elevated liver enzymes, ketones in urine.     Improving   Continue Solu-Medrol taper as indicated   Zofran, protonix, B6 scheduled   Unisom, Reglan, phenergan PRN worsening nausea   Thiamine ordered   D5W ordered   Monitor hemodynamic status, electrolytes  Advance diet as tolerated   OB/GYN consulted    Acute cystitis without hematuria  Noted on UA admission   Possible source of N/V or exacerbating baseline issue   Cefepime ordered   Hypokalemia  Resolved  Potassium level 3.4 on admission   S/p replacement   Increased anion gap metabolic acidosis  Resolved  Increased anion gap of 18 carbon dioxide of 17 on admission   Continue IV fluid hydration    SIRS (systemic inflammatory response syndrome) (HCC)  SIRS criteria 2/4 (tachycardia, leukocytosis)  UA with bacteria, likely UTI  Cefepime ordered   Maintain MAP above >65  Follow up blood and urine cultures   Repeat CBC, CMP, lactic acid in AM   Trend and replete electrolytes as indicated    14 weeks gestation of pregnancy  Patient with obstetric history G5, P4, currently 14-week EGA  Continue management of nausea and vomiting as above   Monitor fetal heart tones  OB/GYN consulted      VTE Pharmacologic Prophylaxis:   Low Risk (Score 0-2) - Encourage Ambulation.    Mobility:   Basic Mobility Inpatient Raw Score: 22  JH-HLM Goal: 7: Walk 25 feet or more  JH-HLM Achieved: 7: Walk 25 feet or more  JH-HLM Goal achieved. Continue to encourage appropriate mobility.    Patient Centered Rounds: I performed bedside rounds with nursing staff today.   Discussions with Specialists or Other Care Team  Provider: YECENIA    Education and Discussions with Family / Patient: Patient declined call to .     Current Length of Stay: 0 day(s)  Current Patient Status: Observation   Certification Statement: The patient will continue to require additional inpatient hospital stay due to nausea  Discharge Plan: Anticipate discharge tomorrow to home.    Code Status: Level 1 - Full Code    Subjective   Patient seen laying in bed and eating in no acute distress. No complaints. Tolerating p.o. intake. Urinating and stooling without issue. Ambulatory. Sleeping well. Denies chest pain, shortness of breath, nausea, vomiting, constipation, diarrhea.     Objective :  Temp:  [97.8 °F (36.6 °C)-98.8 °F (37.1 °C)] 98.8 °F (37.1 °C)  HR:  [79-97] 79  BP: ()/(47-62) 101/47  Resp:  [18] 18  SpO2:  [98 %-99 %] 99 %  O2 Device: None (Room air)    Body mass index is 22.42 kg/m².     Input and Output Summary (last 24 hours):     Intake/Output Summary (Last 24 hours) at 3/18/2025 0938  Last data filed at 3/18/2025 0900  Gross per 24 hour   Intake 600 ml   Output 1 ml   Net 599 ml       Physical Exam  Vitals and nursing note reviewed.   Constitutional:       General: She is not in acute distress.     Appearance: Normal appearance. She is not ill-appearing or toxic-appearing.   HENT:      Head: Normocephalic and atraumatic.      Nose: Nose normal.   Eyes:      Conjunctiva/sclera: Conjunctivae normal.   Cardiovascular:      Rate and Rhythm: Normal rate and regular rhythm.      Heart sounds: Normal heart sounds. No murmur heard.     No friction rub. No gallop.   Pulmonary:      Effort: Pulmonary effort is normal. No respiratory distress.      Breath sounds: Normal breath sounds. No wheezing, rhonchi or rales.   Abdominal:      Tenderness: There is no abdominal tenderness.      Comments: Gravid   Skin:     General: Skin is warm and dry.   Neurological:      General: No focal deficit present.      Mental Status: She is alert and  oriented to person, place, and time.   Psychiatric:         Mood and Affect: Mood normal.         Behavior: Behavior normal.         Thought Content: Thought content normal.       Lines/Drains:      Lab Results: I have reviewed the following results:   Results from last 7 days   Lab Units 03/18/25  0614   WBC Thousand/uL 13.13*   HEMOGLOBIN g/dL 10.2*   HEMATOCRIT % 30.6*   PLATELETS Thousands/uL 154   SEGS PCT % 73   LYMPHO PCT % 21   MONO PCT % 6   EOS PCT % 0     Results from last 7 days   Lab Units 03/18/25  0523   SODIUM mmol/L 137   POTASSIUM mmol/L 4.0   CHLORIDE mmol/L 110*   CO2 mmol/L 19*   BUN mg/dL 4*   CREATININE mg/dL 0.41*   ANION GAP mmol/L 8   CALCIUM mg/dL 7.8*   ALBUMIN g/dL 3.0*   TOTAL BILIRUBIN mg/dL 0.54   ALK PHOS U/L 29*   ALT U/L 35   AST U/L 27   GLUCOSE RANDOM mg/dL 119                 Results from last 7 days   Lab Units 03/17/25  0939 03/17/25  0641   LACTIC ACID mmol/L 0.9  --    PROCALCITONIN ng/ml  --  <0.05       Recent Cultures (last 7 days):   Results from last 7 days   Lab Units 03/17/25  0923 03/17/25  0756 03/13/25  2123   BLOOD CULTURE  Received in Microbiology Lab. Culture in Progress.  Received in Microbiology Lab. Culture in Progress.  --   --    URINE CULTURE   --  <10,000 cfu/ml <10,000 cfu/ml Gram Positive Cocci*       Imaging Results Review: No pertinent imaging studies reviewed.  Other Study Results Review: No additional pertinent studies reviewed.    Last 24 Hours Medication List:     Current Facility-Administered Medications:     acetaminophen (TYLENOL) tablet 650 mg, Q6H PRN    cefepime (MAXIPIME) IVPB (premix in dextrose) 2,000 mg 50 mL, Q12H, Last Rate: 2,000 mg (03/18/25 0818)    dextrose 5 % and sodium chloride 0.9 % infusion, Continuous, Last Rate: 125 mL/hr (03/18/25 0624)    doxylamine (UNISOM) tablet 25 mg, HS PRN    methylPREDNISolone sodium succinate (Solu-MEDROL) injection 16 mg, TID With Meals **OR** methylprednisolone (MEDROL) tablet 16 mg, TID With  Meals    metoclopramide (REGLAN) injection 10 mg, Q6H PRN    ondansetron (ZOFRAN) injection 4 mg, Q6H PRN    ondansetron (ZOFRAN) injection 4 mg, Q4H    pantoprazole (PROTONIX) injection 40 mg, Q24H PERI    pyridoxine (VITAMIN B6) tablet 25 mg, HS    thiamine (VITAMIN B1) 100 mg in sodium chloride 0.9 % 50 mL IVPB, Daily, Last Rate: 100 mg (03/18/25 0818)    Administrative Statements   Today, Patient Was Seen By: Emma Cruz DO    **Please Note: This note may have been constructed using a voice recognition system.**

## 2025-03-19 ENCOUNTER — TELEPHONE (OUTPATIENT)
Age: 30
End: 2025-03-19

## 2025-03-19 NOTE — TELEPHONE ENCOUNTER
With the assistance of  [id number].  Patient called looking for an appointment in the [place] office.  Nothing available.  Patient to keep 3/25/25 appointment.  Patient would prefer the [place] office.  Explained that there are multiple offices with in the practice and the locations.  Patient wanted to know if her records were received.  Patient informed her [facility] my charge and [facility] My chart are linked; therefore, records are able to be seen by provider.

## 2025-03-20 ENCOUNTER — OFFICE VISIT (OUTPATIENT)
Dept: FAMILY MEDICINE CLINIC | Facility: CLINIC | Age: 30
End: 2025-03-20
Payer: COMMERCIAL

## 2025-03-20 VITALS
SYSTOLIC BLOOD PRESSURE: 82 MMHG | HEIGHT: 62 IN | OXYGEN SATURATION: 99 % | WEIGHT: 122.8 LBS | HEART RATE: 125 BPM | DIASTOLIC BLOOD PRESSURE: 60 MMHG | BODY MASS INDEX: 22.6 KG/M2 | TEMPERATURE: 97 F

## 2025-03-20 DIAGNOSIS — J30.2 SEASONAL ALLERGIES: ICD-10-CM

## 2025-03-20 DIAGNOSIS — Z3A.15 15 WEEKS GESTATION OF PREGNANCY: ICD-10-CM

## 2025-03-20 DIAGNOSIS — O21.0 HYPEREMESIS GRAVIDARUM: Primary | ICD-10-CM

## 2025-03-20 LAB
ATRIAL RATE: 89 BPM
P AXIS: 65 DEGREES
PR INTERVAL: 134 MS
QRS AXIS: 79 DEGREES
QRSD INTERVAL: 88 MS
QT INTERVAL: 380 MS
QTC INTERVAL: 463 MS
T WAVE AXIS: 58 DEGREES
VENTRICULAR RATE: 89 BPM

## 2025-03-20 PROCEDURE — 93010 ELECTROCARDIOGRAM REPORT: CPT | Performed by: INTERNAL MEDICINE

## 2025-03-20 PROCEDURE — 99496 TRANSJ CARE MGMT HIGH F2F 7D: CPT | Performed by: PHYSICIAN ASSISTANT

## 2025-03-20 RX ORDER — CETIRIZINE HYDROCHLORIDE 10 MG/1
10 TABLET ORAL DAILY
Qty: 90 TABLET | Refills: 1 | Status: SHIPPED | OUTPATIENT
Start: 2025-03-20

## 2025-03-20 RX ORDER — ONDANSETRON 4 MG/1
4 TABLET, ORALLY DISINTEGRATING ORAL EVERY 6 HOURS PRN
Qty: 20 TABLET | Refills: 0 | Status: SHIPPED | OUTPATIENT
Start: 2025-03-20

## 2025-03-20 RX ORDER — PROMETHAZINE HYDROCHLORIDE 12.5 MG/1
12.5 SUPPOSITORY RECTAL EVERY 6 HOURS PRN
Qty: 12 EACH | Refills: 0 | Status: SHIPPED | OUTPATIENT
Start: 2025-03-20 | End: 2025-04-19

## 2025-03-21 ENCOUNTER — TRANSITIONAL CARE MANAGEMENT (OUTPATIENT)
Dept: FAMILY MEDICINE CLINIC | Facility: CLINIC | Age: 30
End: 2025-03-21

## 2025-03-21 NOTE — PROGRESS NOTES
Transition of Care Visit  Name: Hayley Brown      : 1995      MRN: [id number]  Encounter Provider: Sony Patterson PA-C  Encounter Date: 3/20/2025   Encounter department: [facility] PRIMARY [facility]    Assessment & Plan  Hyperemesis gravidarum     - Presented to ED s/p nausea, vomting. Patient currently 14W 6 D pregnant    - Labs showed hypokalemia, elevated liver enzymes. Urine also positive for bacteria    - Patient was admitted and started on fluids, anti-emetics, steroids for hyperemesis and placed on NPO diet    - One day into admission patient was able to maintain full oral diet.   - Fetal HR stable 145    - Hypokalemia, liver enzymes had stabilized    - OB/GYN consulted and patient was placed on six days keflex, steroid taper, B6, zofran/promethazine PRN, protonix QD   - Patient tolerating taper, ABX but unfortunately still has felt nauseous and has been unable to take PRN anti-emetics as pharmacy it was sent to was out of stock    - Meds reviewed and are appropriate, will send refill to other pharmacy    - Patient establishing w/ [facility] on Wednesday 3/26   Orders:    promethazine (PHENERGAN) 12.5 mg suppository; Insert 1 suppository (12.5 mg total) into the rectum every 6 (six) hours as needed for nausea or vomiting    ondansetron (ZOFRAN-ODT) 4 mg disintegrating tablet; Take 1 tablet (4 mg total) by mouth every 6 (six) hours as needed for nausea or vomiting    CBC and differential; Future    Comprehensive metabolic panel; Future    Seasonal allergies     - PND observed on exam, patient also noting throat irritation w/ weather change    - Will trial QD zyrtec  Orders:    cetirizine (ZyrTEC) 10 mg tablet; Take 1 tablet (10 mg total) by mouth daily    Follow up in 3 weeks for annual physical, recheck nausea       History of Present Illness     Transitional Care Management Review:   Hayley Brown is a 30 y.o. female here for TCM follow up.     During the TCM phone call  patient stated:  TCM Call (since 3/7/2025)       Hospital care reviewed  Records reviewed    Patient [facility] hospitial[facility] at  [facility]    Date of Admission  03/17/25    Date of discharge  03/18/25    Diagnosis  Hyperemesis gravidarum    Disposition  Home    Were the patients medications reviewed and updated  Yes    Current Symptoms  None          TCM Call (since 3/7/2025)       Post hospital issues  None    Scheduled for follow up?  Yes    Patients specialists  Other (comment)    Other specialists names  OB    Did you obtain your prescribed medications  No    Do you need help managing your prescriptions or medications  No    Is transportation to your appointment needed  No    Living Arrangements  Spouse or Significiant other    Support System  Spouse    The type of support provided  Emotional; Financial; Physical    Do you have social support  Yes, as much as I need    Are you recieving home care services  No          Hayley Brown is a 30 y.o. female no known PMH presenting for TCM.Patient currently about 15 weeks pregnant and was hospitalized for recurrent nausea/vomiting. Patient was stabilized on IV fluids, PRN anti-emetics in hospital and was discharged on steroid course, ABX secondary to urinary infection along with PRN medications. Since discharge, symptoms have mildly improved however nausea does persist. Of note, patient has not been able to use PRN anti-emetic medications prescribed as they were out of stock at pharmacy. Patient denies any vaginal bleeding, discharge, abdominal pain, diarrhea at this time.       Review of Systems   Constitutional:  Negative for fatigue and fever.   HENT:  Positive for sore throat.    Respiratory:  Negative for cough and shortness of breath.    Cardiovascular:  Negative for chest pain.   Gastrointestinal:  Positive for nausea and vomiting. Negative for diarrhea.   Neurological:  Negative for headaches.     Objective   BP (!) 82/60 (BP Location: Right arm, Patient  "Position: Sitting, Cuff Size: Adult)   Pulse (!) 125   Temp (!) 97 °F (36.1 °C) (Tympanic)   Ht 5' 2\" (1.575 m)   Wt 55.7 kg (122 lb 12.8 oz)   SpO2 99%   BMI 22.46 kg/m²     Physical Exam  Constitutional:       Appearance: Normal appearance.   HENT:      Head: Normocephalic.      Right Ear: External ear normal.      Left Ear: External ear normal.      Nose: Nose normal.      Mouth/Throat:      Mouth: Mucous membranes are moist.      Pharynx: Oropharynx is clear.   Eyes:      Conjunctiva/sclera: Conjunctivae normal.   Cardiovascular:      Rate and Rhythm: Normal rate and regular rhythm.      Heart sounds: Normal heart sounds.   Pulmonary:      Effort: Pulmonary effort is normal.      Breath sounds: Normal breath sounds.   Abdominal:      General: Bowel sounds are normal.      Palpations: Abdomen is soft.          Comments: Uterus palpable to about 3 inches below umbilicus    Neurological:      Mental Status: She is alert and oriented to person, place, and time.   Psychiatric:         Behavior: Behavior normal.       Medications have been reviewed by provider in current encounter      "

## 2025-03-21 NOTE — ASSESSMENT & PLAN NOTE
- Presented to ED s/p nausea, vomting. Patient currently 14W 6 D pregnant    - Labs showed hypokalemia, elevated liver enzymes. Urine also positive for bacteria    - Patient was admitted and started on fluids, anti-emetics, steroids for hyperemesis and placed on NPO diet    - One day into admission patient was able to maintain full oral diet.   - Fetal HR stable 145    - Hypokalemia, liver enzymes had stabilized    - OB/GYN consulted and patient was placed on six days keflex, steroid taper, B6, zofran/promethazine PRN, protonix QD   - Patient tolerating taper, ABX but unfortunately still has felt nauseous and has been unable to take PRN anti-emetics as pharmacy it was sent to was out of stock    - Meds reviewed and are appropriate, will send refill to other pharmacy    - Patient establishing w/  [facility] on Wednesday 3/26   Orders:    promethazine (PHENERGAN) 12.5 mg suppository; Insert 1 suppository (12.5 mg total) into the rectum every 6 (six) hours as needed for nausea or vomiting    ondansetron (ZOFRAN-ODT) 4 mg disintegrating tablet; Take 1 tablet (4 mg total) by mouth every 6 (six) hours as needed for nausea or vomiting    CBC and differential; Future    Comprehensive metabolic panel; Future

## 2025-03-22 LAB
BACTERIA BLD CULT: NORMAL
BACTERIA BLD CULT: NORMAL

## 2025-03-24 ENCOUNTER — TELEPHONE (OUTPATIENT)
Dept: ADMINISTRATIVE | Facility: OTHER | Age: 30
End: 2025-03-24

## 2025-03-24 PROBLEM — O21.9 NAUSEA AND VOMITING DURING PREGNANCY: Status: ACTIVE | Noted: 2025-03-03

## 2025-03-24 PROBLEM — Z86.19 HISTORY OF CHLAMYDIA: Status: ACTIVE | Noted: 2025-03-03

## 2025-03-24 PROBLEM — O21.0 HYPEREMESIS AFFECTING PREGNANCY, ANTEPARTUM: Status: ACTIVE | Noted: 2025-03-24

## 2025-03-24 PROBLEM — Z3A.15 15 WEEKS GESTATION OF PREGNANCY: Status: ACTIVE | Noted: 2025-03-17

## 2025-03-24 PROBLEM — K85.90 ACUTE PANCREATITIS: Status: ACTIVE | Noted: 2022-03-29

## 2025-03-24 PROBLEM — N83.291 OTHER OVARIAN CYST, RIGHT SIDE: Status: ACTIVE | Noted: 2025-03-03

## 2025-03-24 NOTE — TELEPHONE ENCOUNTER
Upon review of the In Basket request we were able to locate, review, and update the patient chart as requested for Pap Smear (HPV) aka Cervical Cancer Screening.    Any additional questions or concerns should be emailed to the Practice Liaisons via the appropriate education email address, please do not reply via In Basket.    Thank you  Yaneli Harvey MA   [facility]

## 2025-03-24 NOTE — TELEPHONE ENCOUNTER
----- Message from Sony Patterson PA-C sent at 3/21/2025  1:51 PM EDT -----  Regarding: Care Gap Request  03/21/25 1:51 PM    Hello, our patient above has had Pap Smear (HPV) aka Cervical Cancer Screening completed/performed. Please assist in updating the patient chart by pulling the document from labs Tab within Chart Review. The date of service is 1/10/2023.     Thank you,  Sony Patterson PA-C  [facility]

## 2025-03-25 ENCOUNTER — ROUTINE PRENATAL (OUTPATIENT)
Dept: OBGYN CLINIC | Facility: CLINIC | Age: 30
End: 2025-03-25
Payer: COMMERCIAL

## 2025-03-25 VITALS — WEIGHT: 125.8 LBS | DIASTOLIC BLOOD PRESSURE: 62 MMHG | SYSTOLIC BLOOD PRESSURE: 102 MMHG | BODY MASS INDEX: 23.01 KG/M2

## 2025-03-25 DIAGNOSIS — Z34.92 SECOND TRIMESTER PREGNANCY: ICD-10-CM

## 2025-03-25 DIAGNOSIS — O21.9 NAUSEA AND VOMITING IN PREGNANCY: ICD-10-CM

## 2025-03-25 DIAGNOSIS — E05.90 SUBCLINICAL HYPERTHYROIDISM: ICD-10-CM

## 2025-03-25 DIAGNOSIS — O21.0 HYPEREMESIS AFFECTING PREGNANCY, ANTEPARTUM: Primary | ICD-10-CM

## 2025-03-25 DIAGNOSIS — O23.42 UTI (URINARY TRACT INFECTION) DURING PREGNANCY, SECOND TRIMESTER: ICD-10-CM

## 2025-03-25 DIAGNOSIS — Z3A.15 15 WEEKS GESTATION OF PREGNANCY: ICD-10-CM

## 2025-03-25 PROCEDURE — 99203 OFFICE O/P NEW LOW 30 MIN: CPT | Performed by: ADVANCED PRACTICE MIDWIFE

## 2025-03-25 RX ORDER — METOCLOPRAMIDE 10 MG/1
10 TABLET ORAL 3 TIMES DAILY PRN
Qty: 30 TABLET | Refills: 0 | Status: SHIPPED | OUTPATIENT
Start: 2025-03-25

## 2025-03-25 NOTE — ASSESSMENT & PLAN NOTE
Patient seen today- transfer of care at 15 weeks  Seen in ED for hyperemesis  Referral to MFM  ordered  Will need OB intake  Blood type A positive  Cell free DNA- low risk  AFP ordered   Reviewed visit sequence- reviewed  Oriented to practice providers, delivery [facility]  Verbalizes understanding and will schedule with [facility]      Orders:    Ambulatory Referral to Maternal Fetal Medicine; Future    CBC and differential; Future    Comprehensive metabolic panel; Future    Alpha fetoprotein, maternal; Future    TSH, 3rd generation; Future    T4, free; Future    metoclopramide (REGLAN) 10 mg tablet; Take 1 tablet (10 mg total) by mouth 3 (three) times a day as needed (as needed for nausea every 8 hours)    doxylamine (UNISOM) 25 MG tablet; Take 1 tablet (25 mg total) by mouth daily at bedtime as needed for sleep

## 2025-03-25 NOTE — PROGRESS NOTES
Name: Hayley Brown      : 1995      MRN: [id number]  Encounter Provider: Martha Horton CNM  Encounter Date: 3/25/2025   Encounter department: [facility] [place]  :  Assessment & Plan  Hyperemesis affecting pregnancy, antepartum  Follow-up labs ordered  Currently taking using the phenergan suppository- notes that it is working the best.   - discussed option of using Reglan 10 mg 3 times per day as needed.   - would like to try Reglan instead of the suppository    Orders:    CBC and differential; Future    Comprehensive metabolic panel; Future    metoclopramide (REGLAN) 10 mg tablet; Take 1 tablet (10 mg total) by mouth 3 (three) times a day as needed (as needed for nausea every 8 hours)    doxylamine (UNISOM) 25 MG tablet; Take 1 tablet (25 mg total) by mouth daily at bedtime as needed for sleep    Subclinical hyperthyroidism  Repeat TSH and free T4 ordered      Orders:    TSH, 3rd generation; Future    T4, free; Future    Second trimester pregnancy    Orders:    Ambulatory Referral to Maternal Fetal Medicine; Future    CBC and differential; Future    Comprehensive metabolic panel; Future    Alpha fetoprotein, maternal; Future    TSH, 3rd generation; Future    T4, free; Future    15 weeks gestation of pregnancy  Patient seen today- transfer of care at 15 weeks  Seen in ED for hyperemesis  Referral to MFM  ordered  Will need OB intake  Blood type A positive  Cell free DNA- low risk  AFP ordered   Reviewed visit sequence- reviewed  Oriented to practice providers, delivery [facility]  Verbalizes understanding and will schedule with MF      Orders:    Ambulatory Referral to Maternal Fetal Medicine; Future    CBC and differential; Future    Comprehensive metabolic panel; Future    Alpha fetoprotein, maternal; Future    TSH, 3rd generation; Future    T4, free; Future    metoclopramide (REGLAN) 10 mg tablet; Take 1 tablet (10 mg total) by mouth 3 (three) times a day  as needed (as needed for nausea every 8 hours)    doxylamine (UNISOM) 25 MG tablet; Take 1 tablet (25 mg total) by mouth daily at bedtime as needed for sleep    Nausea and vomiting in pregnancy    Orders:    Ambulatory Referral to Obstetrics / Gynecology    metoclopramide (REGLAN) 10 mg tablet; Take 1 tablet (10 mg total) by mouth 3 (three) times a day as needed (as needed for nausea every 8 hours)    doxylamine (UNISOM) 25 MG tablet; Take 1 tablet (25 mg total) by mouth daily at bedtime as needed for sleep        History of Present Illness   Hayley Brown is a 30 y.o.  female who presents to establish [facility] practice for prenatal care at 15w4d.  She is accompanied by a friend who is interpreting if Ramon does not understand.   Pregnancy ROS: no leakage of fluid, pelvic pain, or vaginal bleeding.  Notes that nausea is improved with the phenergan suppository, but would like something oral. Will order Reglan- patient is aware that she cannot take phenergan with the Reglan. No fetal movement.     States that she lost about 13 lbs, but has noted increase recently. She is trying to get protein daily and notes that she is maintaining a bland diet - getting veggies and fruits.     Objective:  /62   Wt 57.1 kg (125 lb 12.8 oz)   BMI 23.01 kg/m²   Pregravid Weight/BMI: Pregravid weight not on file (BMI Could not be calculated)   Current Weight: 57.1 kg (125 lb 12.8 oz)   Total Weight Gain: Not found.   Pre- Vitals    Flowsheet Row Most Recent Value   Prenatal Assessment    Fetal Heart Rate 150   Prenatal Vitals    Blood Pressure 102/62   Weight - Scale 57.1 kg (125 lb 12.8 oz)   Urine Albumin/Glucose    Dilation/Effacement/Station    Vaginal Drainage    Edema    LLE Edema None   RLE Edema None          Hayley Brown is a 30 y.o. female who presents to establish care for pregnancy  History obtained from: patient    Review of Systems   Constitutional:  Negative for chills and fever.   Respiratory:   Negative for cough and shortness of breath.    Cardiovascular:  Negative for chest pain and palpitations.   Genitourinary:  Negative for difficulty urinating and vaginal bleeding.   Psychiatric/Behavioral:  The patient is not nervous/anxious.      Medical History Reviewed by provider this encounter:     .  Current Outpatient Medications on File Prior to Visit   Medication Sig Dispense Refill    cephalexin (KEFLEX) 500 mg capsule Take 1 capsule (500 mg total) by mouth every 6 (six) hours for 6 days 24 capsule 0    cetirizine (ZyrTEC) 10 mg tablet Take 1 tablet (10 mg total) by mouth daily 90 tablet 1    ondansetron (ZOFRAN-ODT) 4 mg disintegrating tablet Take 1 tablet (4 mg total) by mouth every 6 (six) hours as needed for nausea or vomiting 20 tablet 0    pantoprazole (PROTONIX) 20 mg tablet Take 1 tablet (20 mg total) by mouth daily 30 tablet 0    promethazine (PHENERGAN) 12.5 mg suppository Insert 1 suppository (12.5 mg total) into the rectum every 6 (six) hours as needed for nausea or vomiting 12 each 0    pyridoxine (B-6) 25 MG tablet Take 1 tablet (25 mg total) by mouth daily at bedtime 30 tablet 0    [DISCONTINUED] doxylamine (UNISOM) 25 MG tablet Take 1 tablet (25 mg total) by mouth daily at bedtime as needed for sleep (Patient not taking: Reported on 3/25/2025) 30 tablet 0     No current facility-administered medications on file prior to visit.         Objective   /62   Wt 57.1 kg (125 lb 12.8 oz)   BMI 23.01 kg/m²      Physical Exam  Vitals reviewed.   Constitutional:       Appearance: Normal appearance.   Pulmonary:      Effort: Pulmonary effort is normal.   Abdominal:      Comments: Gravid uterus   Neurological:      Mental Status: She is alert and oriented to person, place, and time.   Psychiatric:         Mood and Affect: Mood normal.         Behavior: Behavior normal.

## 2025-03-25 NOTE — ASSESSMENT & PLAN NOTE
Follow-up labs ordered  Currently taking using the phenergan suppository- notes that it is working the best.   - discussed option of using Reglan 10 mg 3 times per day as needed.   - would like to try Reglan instead of the suppository    Orders:    CBC and differential; Future    Comprehensive metabolic panel; Future    metoclopramide (REGLAN) 10 mg tablet; Take 1 tablet (10 mg total) by mouth 3 (three) times a day as needed (as needed for nausea every 8 hours)    doxylamine (UNISOM) 25 MG tablet; Take 1 tablet (25 mg total) by mouth daily at bedtime as needed for sleep

## 2025-03-26 ENCOUNTER — TELEPHONE (OUTPATIENT)
Age: 30
End: 2025-03-26

## 2025-03-27 NOTE — TELEPHONE ENCOUNTER
Spoke with patient this morning said she didn't feel well, asked if I can call her back after 11:30 today.

## 2025-03-27 NOTE — TELEPHONE ENCOUNTER
MD Sho Abrams,    We should see her in 5-6 weeks for her detailed ultrasound.    Thank you,    Mark

## 2025-04-02 ENCOUNTER — OB ABSTRACT (OUTPATIENT)
Dept: OBGYN CLINIC | Facility: CLINIC | Age: 30
End: 2025-04-02

## 2025-04-02 NOTE — PATIENT INSTRUCTIONS
Congratulations on your pregnancy!  We thank you for allowing us to participate in your care.    NEXT STEPS    Go to the lab to have your prenatal blood work competed, if you have not already done so.  We ask that you have this blood work done prior to your first routine prenatal appointment.  There is a listing of [facility] and locations in your prenatal folder. You may also visit [email or web address] or call [phone].   Please be aware that some insurance companies may require you to go to a specific lab ([facility] or [facility]). You can verify this by contacting your insurance company.   If you have decided to be screened for CF and SMA genetic testing, these tests require prior authorization and scheduling.  Prior Authorization is not a guarantee of payment. There may be out of pocket expenses that includes copays, deductibles and or coinsurance for your individual plan.  Please call [phone] if our team has not contacted you in 7 business days.  A referral was placed to Maternal Fetal Medicine for an ultrasound and or genetic testing.  You may have already scheduled or have had this appointment.  If not, please call their office to schedule.  Based on the referral placed by our office, they will know how to schedule you appropriately.    The phone number for Maternal Fetal Medicine is [phone]. For additional detailed contact information for Maternal Fetal Medicine, please refer to the prenatal folder provided to you by our office.   Return to our office for your first routine prenatal visit.   Some offices have multiple locations. Always check the address of your appointment to ensure you are going to the correct office.   If you experience any problems or concerns, call the office directly.   Remember to only use [facility] for none urgent concerns or questions.  Our doctors deliver at [facility] in [place]. The address is provided below.     [facility]   [address]  [place] [address]    Click on the links below to review our Pregnancy Essential Guide.  [facility] Pregnancy Essentials Guide  . [facility] ([email or web address].org)     Click on the link below to review [facility]'s Lab locations.  [facility]'s Lab Locations    "[employer] resource  Findhelp is a tool to connect you to community resources you may need.    Thank you,  Dacia MENDOZA LPN  OB Nurse Navigator         Patient Education     Embarazo - El cuarto mes   Acerca de donovan kari   Es importante que aprenda a cuidar de sí misma para poder tener un bebé saludable y un parto seguro. Es resendiz contar con atención médica a lo brandy del embarazo.  El cuarto mes del embarazo inicia alrededor de la semana 14 y dura hasta la semana 18. Saber qué collado avanzada se encuentra le indicará lo que es normal para dicha etapa del embarazo y le ayudará a planear a futuro.  General   Crecimiento y desarrollo   Chevy el cuarto mes de embarazo, puede esperar lo siguiente.  Usted puede:  Comenzar a lucir embarazada. Es normal subir entre 5 y 10 libras (de 2 kg a 4,5 kg) en total en los primeros 4 meses.  Tener acidez estomacal  Sentir que tiene problemas para prestar atención  Tener menos náuseas  Notar que los senos crecen y se hacen visibles las venas  Tener las venas hinchadas en las piernas y los pies, más hemorragias nasales o sangrado cuando se cepilla los dientes. Estos síntomas se deben a que hay más circulación sanguínea en el cuerpo cuando está embarazada.  Notar más hinchazón en las ze y los pies.  Comenzar a sentir unos movimientos cuando está sentada o acostada tranquila: es el bebé que patea.  Sentir dolor en los costados cuando hace movimientos bruscos. [employer] es normal y ocurre porque los ligamentos del abdomen se están estirando.  Tener un poco menos de energía. Es resendiz que priec ejercicio, geoff consulte con ryan médico antes de comenzar con ejercicios nuevos.  Por lo general es seguro tener relaciones  sexuales joshua el embarazo. No le hará daño al bebé.  Ryan bebé:  La piel es muy delgada y se pueden andrew fácilmente los vasos sanguíneos a través de malissa. El bebé está cubierto con mucho vello adilene que protege la piel.  Los huesos comienzan a endurecerse. El bebé puede fruncir el ceño, sonreír, estirarse y moverse.  Practica movimientos de respiración dentro del útero.  El bebé mide alrededor de 6 pulgadas (16 cm) y pesa unos 7 onzas (200 g). Tiene el tamaño aproximado de luther naranja.  Cosas que se deben considerar   Evitar el consumo de alcohol, medicamentos, productos de tabaco y humo de segunda mano  Consulte al médico antes de lisa cualquier tipo de medicamento. Continúe tomando las vitaminas con ácido fólico.  Evite limpiar las madeleine de arena para gatos. Podría contraer luther enfermedad que causa defectos congénitos en el bebé.  En donovan mes, se pueden efectuar la amniocentesis y otras pruebas de detección prenatales.  Intente dormir de lado. Coloque luther almohada entre las piernas. No duerma boca arriba. Derma ayudará a que la any circule noah hacia el bebé.  Cambie de posición y levántese despacio. Ryan corazón tiene que trabajar mucho para hacer frente a todo el volumen de any adicional.  ¿Dónde llevará a ryan bebé para que lo cuiden después de que nazca? Es un buen momento para buscar un médico pediatra.  Coma frutas y alimentos frescos con mucha fibra para aligerar las heces duras.  Rachelle al menos de 6 a 8 vasos de agua cada día.  ¿Cuándo isidro llamar al médico?   Hemorragia vaginal  Filtración de líquido por la vagina  Problemas de estreñimiento  Dolor abdominal  Enfermedad o infección  Melissa de chacorta javed o que no desaparecen  Exención de responsabilidad y uso de la información del consumidor   Esta información general es un resumen limitado de la información sobre el diagnóstico, el tratamiento y/o la medicación. No pretende ser exhaustivo y debe utilizarse lora luther herramienta para ayudar al usuario  a comprender y/o evaluar las posibles opciones de diagnóstico y tratamiento. NO incluye toda la información sobre las enfermedades, los tratamientos, los medicamentos, los efectos secundarios o los riesgos que pueden aplicarse a un paciente específico. No tiene por objeto ser un consejo médico ni un sustituto del consejo médico. Tampoco pretende reemplazar al diagnóstico o el tratamiento proporcionados por un proveedor de atención médica con base en el examen y la evaluación por parte de donovan proveedor de las circunstancias específicas y únicas de un paciente. Los pacientes deben hablar con un proveedor de atención médica para obtener información completa sobre ryan lin, preguntas médicas y opciones de tratamiento, incluidos los riesgos o beneficios relacionados con el uso de medicamentos. Esta información no respalda ningún tratamiento o medicamento lora seguro, eficaz o aprobado para tratar a un paciente específico. Up[employer] Inc[employer] y ivis afiliados renuncian a cualquier garantía o responsabilidad relacionada con esta información o con el uso que se price de esta. El uso de esta información se rige por las Condiciones de uso, disponibles en https://www.[employer].com/en/know/clinical-effectiveness-terms   Copyright   Copyright © 2024 UpToDate, Inc. y ivis licenciantes y/o afiliados. Todos los derechos reservados.

## 2025-04-03 ENCOUNTER — PATIENT OUTREACH (OUTPATIENT)
Dept: OBGYN CLINIC | Facility: CLINIC | Age: 30
End: 2025-04-03

## 2025-04-03 ENCOUNTER — INITIAL PRENATAL (OUTPATIENT)
Dept: OBGYN CLINIC | Facility: CLINIC | Age: 30
End: 2025-04-03
Payer: COMMERCIAL

## 2025-04-03 ENCOUNTER — PATIENT MESSAGE (OUTPATIENT)
Dept: OBGYN CLINIC | Facility: CLINIC | Age: 30
End: 2025-04-03

## 2025-04-03 VITALS
BODY MASS INDEX: 23.41 KG/M2 | DIASTOLIC BLOOD PRESSURE: 60 MMHG | WEIGHT: 124 LBS | HEIGHT: 61 IN | SYSTOLIC BLOOD PRESSURE: 100 MMHG

## 2025-04-03 DIAGNOSIS — Z83.3 FAMILY HISTORY OF DIABETES MELLITUS: ICD-10-CM

## 2025-04-03 DIAGNOSIS — Z76.89 ENCOUNTER FOR SOCIAL WORK INTERVENTION: ICD-10-CM

## 2025-04-03 DIAGNOSIS — Z34.82 ENCOUNTER FOR SUPERVISION OF NORMAL PREGNANCY IN MULTIGRAVIDA IN SECOND TRIMESTER: Primary | ICD-10-CM

## 2025-04-03 PROCEDURE — 99211 OFF/OP EST MAY X REQ PHY/QHP: CPT

## 2025-04-03 NOTE — PROGRESS NOTES
OB INTAKE INTERVIEW April 3, 2025    Patient is 30 y.o. who presents for OB intake at 16w6d.  She is accompanied by her friend and son during this encounter.  Translating services declined by patient. Patient's friend translated.   The father of her baby (Trey Lynch) is involved in the pregnancy and he is 31 years old.      No LMP recorded (lmp unknown). Patient is pregnant.  Ultrasound: Measured 8 weeks 4 days on 2025  Estimated Date of Delivery: 25 established by dating ultrasound due to unknown LMP.    Signs/Symptoms of Pregnancy  Current pregnancy symptoms: breast tenderness, fatigue, nausea, vomiting, frequent urination, and constipation  Headaches no  Cramping YES  Spotting no  PICA cravings no    Diabetes-  Body mass index is 23.43 kg/m².  If patient has 1 or more, please order early 1 hour GTT  History of GDM no  BMI >35 no  History of PCOS or current metformin use no  History of LGA/macrosomic infant (4000g/9lbs) no    If patient has 2 or more, please order early 1 hour GTT  BMI>30 no  AMA no  First degree relative with type 2 diabetes YES-father  History of chronic HTN, hyperlipidemia, elevated A1C no  High risk race (, , ,  or ) YES-    Hypertension- if you answer yes to any of the following, please order baseline preeclampsia labs (cbc, comprehensive metabolic panel, urine protein creatinine ratio, uric acid)  History of of chronic HTN no  History of gestational HTN no  History of preeclampsia, eclampsia, or HELLP syndrome no  History of diabetes no  History of lupus,sjogrens syndrome, kidney disease no    Thyroid- if yes order TSH with reflex T4  History of thyroid disease YES-subclinical hyperthyroidism    Bleeding Disorder or Hx of DVT-patient or first degree relative with history of. Order the following if not done previously.   (Factor V, antithrombin III, prothrombin gene mutation, protein C and S Ag, lupus  anticoagulant, anticardiolipin, beta-2 glycoprotein)   no    OB/GYN-  History of abnormal pap smear no       Date of last pap smear 01/10/2023  History of HPV no  History of Herpes/HSV no  History of other STI (gonorrhea, chlamydia, trich) YES-CT  History of prior  YESx4  History of prior  no  History of  delivery prior to 36 weeks 6 days no  History of blood transfusion no  Ok for blood transfusion  Yes    Substance screening-   History of tobacco use no  Currently using tobacco no  Substance Use Screen Level:  N/A    MRSA Screening-   Does the pt have a hx of MRSA? no    Immunizations:  Influenza vaccine given this season: NO   History of Varicella or Vaccination: YES   Discussed Tdap vaccine:  YES  Discussed COVID Vaccine:  YES    Genetic/MFM-  Do you or your partner have a history of any of the following in yourselves or first degree relatives?  Cystic fibrosis no  Spinal muscular atrophy no  Hemoglobinopathy/Sickle Cell/Thalassemia no  Fragile X Intellectual Disability no    If yes, discuss Carrier Screening and recommend consultation with MFM/Genetic Counseling and place specific [facility] Referral for.    If no, discuss Carrier Screening being completed once in a lifetime as a standard of care lab test. Place orders for Cystic Fibrosis Gene Test ([id number]) and Spinal Muscular Atrophy DNA (OBR8192).  Patient was informed that prior authorization needs to be completed for these tests and this may take 7-10 business days.  Patient does not desire testing for Cystic Fibrosis and Spinal Muscular Atrophy.    Appointment for Ultrasound at [facility] is scheduled for 2025.    Interview education  [facility] Pregnancy Essentials Book reviewed, discussed and attached to their AVS:  YES    Nurse/Family Partnership-patient may qualify NO; referral placed No     Prenatal lab work scripts YES  Extra labs ordered: 1 hour GTT    Aspirin/Preeclampsia Screen    Risk Level Risk Factor Recommendation   LOW Prior  Uncomplicated full-term delivery YES No Aspirin recommendation        MODERATE Nulliparity no Recommend low-dose aspirin if     BMI>30 no 2 or more moderate risk factors    Family History Preeclampsia (mother/sister) no     35yr old or greater no     Black Race, Concern for SDOH/Low Socioeconomic-YES     IVF Pregnancy  no     Personal History Risks (low birth weight, prior adverse preg outcome, >10yr preg interval) no         HIGH History of Preeclampsia no Recommend low-dose aspirin if     Multifetal gestation no 1 or more high risk factors    Chronic HTN no     Type 1 or 2 Diabetes no     Renal Disease no     Autoimmune Disease  no      Contraindications to ASA therapy:  NSAID/ ASA allergy: no  Nasal polyps: no  Asthma with history of ASA induced bronchospasm: no  Relative contraindications:  History of GI bleed: no  Active peptic ulcer disease: no  Severe hepatic dysfunction: no    Patient does not meet recommendation to take ASA 162mg during this pregnancy from 12-36 wks to lower her risk of preeclampsia.      Mental Health:  History of depression: no  History of anxiety: no  EPDS Screen:  Negative   Score:  8    Dental Exam:  Last dental exam within the past 6 months: No    The patient has a history now or in prior pregnancy notable for: thyroid disease.    Details that I feel the provider should be aware of: Patient a history of subclinical hyperthyroidism, GERD, and pancreatitis.    PN1 visit scheduled. The patient was oriented to our practice and the navigator role.  Reviewed delivering physicians and [facility] for delivery. All questions were answered.    Interviewed by: JULISA LABOY LPN

## 2025-04-03 NOTE — PROGRESS NOTES
Chart review indicates that an order was placed to follow up with patient regarding housing instability. Pt at OB intake today. [id number]. 16w 6d. At OV with her son and a friend. Housing stability noted on SDOH report.    SW outreached patient via [employer]  #[id number] to discuss above, she did not , VM left. SWCM to follow.

## 2025-04-04 ENCOUNTER — PATIENT OUTREACH (OUTPATIENT)
Dept: OBGYN CLINIC | Facility: CLINIC | Age: 30
End: 2025-04-04

## 2025-04-04 NOTE — PROGRESS NOTES
Chart review indicates that an order was placed to follow up with patient regarding housing instability. Pt at OB intake today. [id number]. 16w 6d. At OV with her son and a friend. Housing stability noted on SDOH report.     KARRIE outreached patient via [employer]  #[id number] to discuss above, she did not , VM left. KARRIE to follow.     Later entry:    Return VM from patient. KARRIE called pt back via [employer]  ID#[id number]. We did speak for sometime with , conversation very strained due to interpretation. Request for [facility] to call back and preference for sister to translate. Returned call without interpretation.     Patient lives w her four children and her sister. Four children are 12, 9, 6 and 2. Three different fathers for these four children. Different father for baby patient is pregnant. She denies any problems with any of the fathers and they pay child support. This pregnancy was not planned, but welcome. FOANNIE was excited when found out, pt feels it is a blessing. She notes no specific concerns other then housing.     The apartment they are living in only has 2 bedrooms. She reports not enough room when the baby comes. FOB does not live w them, but he is employed and paying their rent. Sister pay utilities. Pt has not worked since Oct 2024. 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 Cannot work now due to sickness related to her pregnancy. 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 FOANNIE is stressed though and having difficulty paying the rent. She has had an eviction notice in the past, but not at this time, at this time rent is up to date. She did try to apply for public housing last year, but was unable to. Requesting assistance to apply. Order for [employer]. ALICJA did tell pt that it takes awhile for public housing, she understands. At this time she is looking online for an apt, but everything is too expensive. And they do not have the money saved to move in somewhere.     ALICJA will research  agencies that can possibly assist w help for zainab to move in somewhere new. Will send to confirmed e-mail.    Pt states they are very behind with PPL bill. She was on OnTrack , but not at this time. Unclear if she received LIHEAP. Order to [employer] to assist with OnTrack and LIHEAP. States only other bills behind on are car ins and car payment, [facility] does not have resource to assist with this.     Pt lost SNAP as she did not do needed paperwork. She has since reapplied and is waiting. She has WIC and has appt on the 11th to enroll new baby.    Pt can drive and sister helps with rides as with this.    Pt has supports of sister and FOB.     Denies any hx of MH dx, medication or counseling. Denies any PPD. Has had some symptoms of sadness this pregnancy which she states are related to the housing issues. She denies any SI/HI. She can confide in her sister and FOB. She will reach out to [facility] if feelings of sadness continue/grow for connection to OP MH.     No hx or current use of nicotine, drugs or alcohol. No hx of DV. No legal involvement for pt or FOB. No CYS involvement. No hx of CYS/abuse in her own childhood. Pt feels safe at home at this time.     Pt switched prenatal care from [facility] to [facility] as she didn't feel she was being treated fairly.     [facility] to make case complex. [employer] to assist w public housing/section 8 application if open and with application for LIHEAP and OnTrack for PPL.

## 2025-04-07 ENCOUNTER — APPOINTMENT (OUTPATIENT)
Dept: LAB | Facility: CLINIC | Age: 30
End: 2025-04-07
Payer: COMMERCIAL

## 2025-04-07 ENCOUNTER — PATIENT OUTREACH (OUTPATIENT)
Dept: OBGYN CLINIC | Facility: CLINIC | Age: 30
End: 2025-04-07

## 2025-04-07 DIAGNOSIS — O21.0 HYPEREMESIS GRAVIDARUM: ICD-10-CM

## 2025-04-07 DIAGNOSIS — Z3A.15 15 WEEKS GESTATION OF PREGNANCY: ICD-10-CM

## 2025-04-07 DIAGNOSIS — Z34.82 ENCOUNTER FOR SUPERVISION OF NORMAL PREGNANCY IN MULTIGRAVIDA IN SECOND TRIMESTER: ICD-10-CM

## 2025-04-07 DIAGNOSIS — Z34.92 SECOND TRIMESTER PREGNANCY: ICD-10-CM

## 2025-04-07 DIAGNOSIS — O21.0 HYPEREMESIS AFFECTING PREGNANCY, ANTEPARTUM: ICD-10-CM

## 2025-04-07 DIAGNOSIS — E05.90 SUBCLINICAL HYPERTHYROIDISM: ICD-10-CM

## 2025-04-07 LAB
ALBUMIN SERPL BCG-MCNC: 3.7 G/DL (ref 3.5–5)
ALP SERPL-CCNC: 40 U/L (ref 34–104)
ALT SERPL W P-5'-P-CCNC: 10 U/L (ref 7–52)
ANION GAP SERPL CALCULATED.3IONS-SCNC: 8 MMOL/L (ref 4–13)
AST SERPL W P-5'-P-CCNC: 14 U/L (ref 13–39)
BASOPHILS # BLD AUTO: 0.05 THOUSANDS/ÂΜL (ref 0–0.1)
BASOPHILS NFR BLD AUTO: 0 % (ref 0–1)
BILIRUB SERPL-MCNC: 0.61 MG/DL (ref 0.2–1)
BUN SERPL-MCNC: 5 MG/DL (ref 5–25)
CALCIUM SERPL-MCNC: 9.1 MG/DL (ref 8.4–10.2)
CHLORIDE SERPL-SCNC: 107 MMOL/L (ref 96–108)
CO2 SERPL-SCNC: 23 MMOL/L (ref 21–32)
CREAT SERPL-MCNC: 0.52 MG/DL (ref 0.6–1.3)
EOSINOPHIL # BLD AUTO: 1.18 THOUSAND/ÂΜL (ref 0–0.61)
EOSINOPHIL NFR BLD AUTO: 10 % (ref 0–6)
ERYTHROCYTE [DISTWIDTH] IN BLOOD BY AUTOMATED COUNT: 13 % (ref 11.6–15.1)
GFR SERPL CREATININE-BSD FRML MDRD: 128 ML/MIN/1.73SQ M
GLUCOSE P FAST SERPL-MCNC: 81 MG/DL (ref 65–99)
HCT VFR BLD AUTO: 39.8 % (ref 34.8–46.1)
HGB BLD-MCNC: 13.1 G/DL (ref 11.5–15.4)
IMM GRANULOCYTES # BLD AUTO: 0.05 THOUSAND/UL (ref 0–0.2)
IMM GRANULOCYTES NFR BLD AUTO: 0 % (ref 0–2)
LYMPHOCYTES # BLD AUTO: 2.91 THOUSANDS/ÂΜL (ref 0.6–4.47)
LYMPHOCYTES NFR BLD AUTO: 25 % (ref 14–44)
MCH RBC QN AUTO: 29.6 PG (ref 26.8–34.3)
MCHC RBC AUTO-ENTMCNC: 32.9 G/DL (ref 31.4–37.4)
MCV RBC AUTO: 90 FL (ref 82–98)
MONOCYTES # BLD AUTO: 0.54 THOUSAND/ÂΜL (ref 0.17–1.22)
MONOCYTES NFR BLD AUTO: 5 % (ref 4–12)
NEUTROPHILS # BLD AUTO: 6.97 THOUSANDS/ÂΜL (ref 1.85–7.62)
NEUTS SEG NFR BLD AUTO: 60 % (ref 43–75)
NRBC BLD AUTO-RTO: 0 /100 WBCS
PLATELET # BLD AUTO: 197 THOUSANDS/UL (ref 149–390)
PMV BLD AUTO: 11.4 FL (ref 8.9–12.7)
POTASSIUM SERPL-SCNC: 3.7 MMOL/L (ref 3.5–5.3)
PROT SERPL-MCNC: 6.5 G/DL (ref 6.4–8.4)
RBC # BLD AUTO: 4.43 MILLION/UL (ref 3.81–5.12)
RUBV IGG SERPL IA-ACNC: 56.6 IU/ML
SODIUM SERPL-SCNC: 138 MMOL/L (ref 135–147)
T4 FREE SERPL-MCNC: 0.6 NG/DL (ref 0.61–1.12)
TSH SERPL DL<=0.05 MIU/L-ACNC: 0.3 UIU/ML (ref 0.45–4.5)
WBC # BLD AUTO: 11.7 THOUSAND/UL (ref 4.31–10.16)

## 2025-04-07 PROCEDURE — 80053 COMPREHEN METABOLIC PANEL: CPT

## 2025-04-07 PROCEDURE — 86803 HEPATITIS C AB TEST: CPT

## 2025-04-07 PROCEDURE — 87389 HIV-1 AG W/HIV-1&-2 AB AG IA: CPT

## 2025-04-07 PROCEDURE — 82105 ALPHA-FETOPROTEIN SERUM: CPT

## 2025-04-07 PROCEDURE — 84443 ASSAY THYROID STIM HORMONE: CPT

## 2025-04-07 PROCEDURE — 86762 RUBELLA ANTIBODY: CPT

## 2025-04-07 PROCEDURE — 87340 HEPATITIS B SURFACE AG IA: CPT

## 2025-04-07 PROCEDURE — 85025 COMPLETE CBC W/AUTO DIFF WBC: CPT

## 2025-04-07 PROCEDURE — 86706 HEP B SURFACE ANTIBODY: CPT

## 2025-04-07 PROCEDURE — 84439 ASSAY OF FREE THYROXINE: CPT

## 2025-04-07 PROCEDURE — 86780 TREPONEMA PALLIDUM: CPT

## 2025-04-07 PROCEDURE — 36415 COLL VENOUS BLD VENIPUNCTURE: CPT

## 2025-04-07 NOTE — PROGRESS NOTES
[facility] researched Find Help and online to find agencies in [facility] to assist with rent/security deposit. Spoke to Kendal at [employer] and they do currently have funding. Advised for patient to call [place] speaking rep, Viry Garcia directly p.[phone].    [facility] outreached pt to advise of above via [employer]  # [phone]. Pt did not , detailed VM with above information left as well as e-mail sent with above information.     IB from CMOC KATE Godfrey to add to care plan-added.     KARRIECM to follow.

## 2025-04-08 ENCOUNTER — PATIENT OUTREACH (OUTPATIENT)
Dept: OBGYN CLINIC | Facility: CLINIC | Age: 30
End: 2025-04-08

## 2025-04-08 ENCOUNTER — RESULTS FOLLOW-UP (OUTPATIENT)
Dept: OBGYN CLINIC | Facility: CLINIC | Age: 30
End: 2025-04-08

## 2025-04-08 LAB
HBV SURFACE AB SER-ACNC: 13.5 MIU/ML
HBV SURFACE AG SER QL: NORMAL
HCV AB SER QL: NORMAL
HIV 1+2 AB+HIV1 P24 AG SERPL QL IA: NORMAL
TREPONEMA PALLIDUM IGG+IGM AB [PRESENCE] IN SERUM OR PLASMA BY IMMUNOASSAY: NORMAL

## 2025-04-08 NOTE — PROGRESS NOTES
OC received a referral in the work que regarding applying for Li Heap or On Track and public housing assistance. OC sent Rosalba Sams LCSW an in basket to add her to the care plan.     OC called [employer]  Line #[phone]. Patient agreed to the Li heap application / On Track. Patient wants housing where she currently lives which is in the [facility] located in [facility].     Patient advised she is on the waiting list for a house in [facility] which is 50 minutes away. She was advised they would have something available in a year. Patient wants to stay in the area she is currently living in.    Patient was referred on Find help to the Housing Authority of the [facility] (program) for housing. Patient is aware of the long waiting list.     OC will follow up with patient next week.

## 2025-04-09 ENCOUNTER — APPOINTMENT (OUTPATIENT)
Dept: LAB | Facility: CLINIC | Age: 30
End: 2025-04-09
Payer: COMMERCIAL

## 2025-04-09 DIAGNOSIS — Z83.3 FAMILY HISTORY OF DIABETES MELLITUS: ICD-10-CM

## 2025-04-09 DIAGNOSIS — Z34.82 ENCOUNTER FOR SUPERVISION OF NORMAL PREGNANCY IN MULTIGRAVIDA IN SECOND TRIMESTER: ICD-10-CM

## 2025-04-09 LAB
2ND TRIMESTER 4 SCREEN SERPL-IMP: NORMAL
AFP ADJ MOM SERPL: 1.49
AFP INTERP AMN-IMP: NORMAL
AFP INTERP SERPL-IMP: NORMAL
AFP INTERP SERPL-IMP: NORMAL
AFP SERPL-MCNC: 68.1 NG/ML
AGE AT DELIVERY: 30.5 YR
GA METHOD: NORMAL
GA: 17.4 WEEKS
GLUCOSE 1H P 50 G GLC PO SERPL-MCNC: 126 MG/DL (ref 70–134)
IDDM PATIENT QL: NO
MULTIPLE PREGNANCY: NO
NEURAL TUBE DEFECT RISK FETUS: 2809 %

## 2025-04-09 PROCEDURE — 82950 GLUCOSE TEST: CPT

## 2025-04-09 PROCEDURE — 36415 COLL VENOUS BLD VENIPUNCTURE: CPT

## 2025-04-10 ENCOUNTER — HOSPITAL ENCOUNTER (EMERGENCY)
Facility: HOSPITAL | Age: 30
Discharge: HOME | End: 2025-04-10
Attending: EMERGENCY MEDICINE
Payer: COMMERCIAL

## 2025-04-10 VITALS
SYSTOLIC BLOOD PRESSURE: 103 MMHG | HEART RATE: 90 BPM | OXYGEN SATURATION: 100 % | RESPIRATION RATE: 16 BRPM | TEMPERATURE: 98.7 F | DIASTOLIC BLOOD PRESSURE: 65 MMHG

## 2025-04-10 DIAGNOSIS — B37.9 CANDIDIASIS: ICD-10-CM

## 2025-04-10 DIAGNOSIS — N89.8 VAGINAL DISCHARGE: Primary | ICD-10-CM

## 2025-04-10 LAB
BACTERIA UR QL AUTO: ABNORMAL /HPF
BILIRUB UR QL STRIP: NEGATIVE
CLARITY UR: CLEAR
COLOR UR: YELLOW
GLUCOSE UR STRIP-MCNC: NEGATIVE MG/DL
HGB UR QL STRIP.AUTO: ABNORMAL
KETONES UR STRIP-MCNC: ABNORMAL MG/DL
LEUKOCYTE ESTERASE UR QL STRIP: ABNORMAL
MUCOUS THREADS UR QL AUTO: ABNORMAL
NITRITE UR QL STRIP: NEGATIVE
NON-SQ EPI CELLS URNS QL MICRO: ABNORMAL /HPF
PH UR STRIP.AUTO: 6 [PH]
PROT UR STRIP-MCNC: NEGATIVE MG/DL
RBC #/AREA URNS AUTO: ABNORMAL /HPF
SP GR UR STRIP.AUTO: 1.02 (ref 1–1.03)
UROBILINOGEN UR STRIP-ACNC: <2 MG/DL
WBC #/AREA URNS AUTO: ABNORMAL /HPF

## 2025-04-10 PROCEDURE — 81001 URINALYSIS AUTO W/SCOPE: CPT | Performed by: PHYSICIAN ASSISTANT

## 2025-04-10 PROCEDURE — 81514 NFCT DS BV&VAGINITIS DNA ALG: CPT | Performed by: PHYSICIAN ASSISTANT

## 2025-04-10 PROCEDURE — 87086 URINE CULTURE/COLONY COUNT: CPT | Performed by: PHYSICIAN ASSISTANT

## 2025-04-10 PROCEDURE — 99283 EMERGENCY DEPT VISIT LOW MDM: CPT

## 2025-04-10 NOTE — ED PROVIDER NOTES
Time reflects when diagnosis was documented in both MDM as applicable and the Disposition within this note       Time User Action Codes Description Comment    4/10/2025  6:20 PM Gurpreet Calix Add [N89.8] Vaginal discharge           ED Disposition       ED Disposition   Discharge    Condition   Stable    Date/Time   Thu Apr 10, 2025  6:21 PM    Comment   Hayley Jonesista discharge to home/self care.                   Assessment & Plan       Medical Decision Making  30-year-old female here for evaluation of vaginal discharge in the setting of pregnancy.  Clear.  Denies bleeding.  No foul odor.  See HPI for further details differential diagnosis includes bacterial vaginosis, candidiasis, UTI, fetal distress, pyelonephritis, miscarriage.    Patient's fetal heart tones are normal, I do not  suspect any fetal distress given lack of vaginal bleeding.  She does have some leukocytes in the urine without any significant bacteria we will await to see with the results of the culture and molecular vaginal panel are prior to starting treatment.    Amount and/or Complexity of Data Reviewed  Labs: ordered. Decision-making details documented in ED Course.        ED Course as of 04/10/25 1822   Thu Apr 10, 2025   1756 Leukocytes, UA(!): Moderate       Medications - No data to display    ED Risk Strat Scores                    No data recorded        SBIRT 22yo+      Flowsheet Row Most Recent Value   Initial Alcohol Screen: US AUDIT-C     1. How often do you have a drink containing alcohol? 0 Filed at: 04/10/2025 1642   2. How many drinks containing alcohol do you have on a typical day you are drinking?  0 Filed at: 04/10/2025 1642   3a. Male UNDER 65: How often do you have five or more drinks on one occasion? 0 Filed at: 04/10/2025 1642   3b. FEMALE Any Age, or MALE 65+: How often do you have 4 or more drinks on one occassion? 0 Filed at: 04/10/2025 1642   Audit-C Score 0 Filed at: 04/10/2025 1642   ALEX: How many times in the  past year have you...    Used an illegal drug or used a prescription medication for non-medical reasons? Never Filed at: 04/10/2025 2340                            History of Present Illness       Chief Complaint   Patient presents with    Vaginal Discharge     Pt reports that about 3 days ago she began with vaginal discharge. Pt reports discharge being mostly clear with some white streaks. Pt reports she is approx 18 weeks pregnant with her 5th pregnancy. Pt states that her prior pregnancies have been to term with no complications. Pt reports regular prenatal care.  Pt states that she is having abdominal cramping. Pt also reports pain in the right flank. Pt denies any current urinary symptoms but reports that 5 days ago she had pain when she would urinate.        Past Medical History:   Diagnosis Date    GERD (gastroesophageal reflux disease)     Subclinical hyperthyroidism     Varicella       History reviewed. No pertinent surgical history.   Family History   Problem Relation Age of Onset    Stroke Mother     Vision loss Mother     Diabetes Father     Alcohol abuse Father     Intellectual disability Sister     Hypertension Brother     Diabetes Maternal Grandmother     Alzheimer's disease Maternal Grandmother     Schizophrenia Cousin       Social History     Tobacco Use    Smoking status: Never     Passive exposure: Past    Smokeless tobacco: Never   Vaping Use    Vaping status: Never Used   Substance Use Topics    Alcohol use: Not Currently     Comment: socially    Drug use: Never      E-Cigarette/Vaping    E-Cigarette Use Never User       E-Cigarette/Vaping Substances    Nicotine No     THC No     CBD No     Flavoring No     Other No     Unknown No       I have reviewed and agree with the history as documented.     This is a 30-year-old female presenting to the emergency department today for evaluation of vaginal discharge clear in color.  Has been ongoing for 2 days.  She is  5 para 4.  She follows with  [facility] she alleges no problems with this pregnancy.  Fetal heart tones reviewed normal.  She has concern regarding infection.  She denies any vaginal bleeding.  No significant abdominal pain.  She has a very minimal amount of musculature pain in the right low back.  She denies fever chills sweats no vomiting.      Vaginal Discharge  Associated symptoms: no abdominal pain, no dysuria, no fever and no vomiting        Review of Systems   Constitutional:  Negative for chills and fever.   HENT:  Negative for ear pain and sore throat.    Eyes:  Negative for pain and visual disturbance.   Respiratory:  Negative for cough and shortness of breath.    Cardiovascular:  Negative for chest pain and palpitations.   Gastrointestinal:  Negative for abdominal pain and vomiting.   Genitourinary:  Positive for vaginal discharge. Negative for dysuria and hematuria.   Musculoskeletal:  Negative for arthralgias and back pain.   Skin:  Negative for color change and rash.   Neurological:  Negative for seizures and syncope.   All other systems reviewed and are negative.          Objective       ED Triage Vitals [04/10/25 1640]   Temperature Pulse Blood Pressure Respirations SpO2 Patient Position - Orthostatic VS   98.7 °F (37.1 °C) 90 103/65 16 100 % Sitting      Temp Source Heart Rate Source BP Location FiO2 (%) Pain Score    Temporal Monitor Left arm -- 6      Vitals      Date and Time Temp Pulse SpO2 Resp BP Pain Score FACES Pain Rating User   04/10/25 1640 98.7 °F (37.1 °C) 90 100 % 16 103/65 6 -- CD            Physical Exam  Vitals reviewed.   Constitutional:       General: She is not in acute distress.     Appearance: Normal appearance. She is not ill-appearing, toxic-appearing or diaphoretic.   HENT:      Head: Normocephalic and atraumatic.      Right Ear: External ear normal.      Left Ear: External ear normal.   Eyes:      General: No scleral icterus.        Right eye: No discharge.         Left eye: No discharge.       Extraocular Movements: Extraocular movements intact.      Conjunctiva/sclera: Conjunctivae normal.   Cardiovascular:      Rate and Rhythm: Normal rate.      Pulses: Normal pulses.   Pulmonary:      Effort: Pulmonary effort is normal. No respiratory distress.      Breath sounds: No stridor.   Abdominal:      Tenderness: There is no abdominal tenderness.      Comments: Gravid abdomen without tenderness   Musculoskeletal:         General: No deformity or signs of injury.      Cervical back: Normal range of motion. No rigidity.   Skin:     General: Skin is warm.      Coloration: Skin is not jaundiced.      Findings: No lesion or rash.   Neurological:      General: No focal deficit present.      Mental Status: She is alert and oriented to person, place, and time. Mental status is at baseline.      Gait: Gait normal.   Psychiatric:         Mood and Affect: Mood normal.         Thought Content: Thought content normal.         Judgment: Judgment normal.         Results Reviewed       Procedure Component Value Units Date/Time    Urine Microscopic [[id number]]  (Abnormal) Collected: 04/10/25 1738    Lab Status: Final result Specimen: Urine, Clean Catch Updated: 04/10/25 1756     RBC, UA 10-20 /hpf      WBC, UA 1-2 /hpf      Epithelial Cells Moderate /hpf      Bacteria, UA Occasional /hpf      MUCUS THREADS Moderate     URINE COMMENT --    UA w Reflex to Microscopic w Reflex to Culture [[id number]]  (Abnormal) Collected: 04/10/25 1738    Lab Status: Final result Specimen: Urine, Clean Catch Updated: 04/10/25 1746     Color, UA Yellow     Clarity, UA Clear     Specific Gravity, UA 1.025     pH, UA 6.0     Leukocytes, UA Moderate     Nitrite, UA Negative     Protein, UA Negative mg/dl      Glucose, UA Negative mg/dl      Ketones,  (4+) mg/dl      Urobilinogen, UA <2.0 mg/dl      Bilirubin, UA Negative     Occult Blood, UA Small     URINE COMMENT --    Urine culture [[id number]] Collected: 04/10/25 1738    Lab Status: In  process Specimen: Urine, Clean Catch Updated: 04/10/25 1746    Molecular Vaginal Panel [[id number]] Collected: 04/10/25 1738    Lab Status: In process Specimen: Genital from Vaginal Updated: 04/10/25 1741            No orders to display       Procedures    ED Medication and Procedure Management   Prior to Admission Medications   Prescriptions Last Dose Informant Patient Reported? Taking?   cetirizine (ZyrTEC) 10 mg tablet  Self No No   Sig: Take 1 tablet (10 mg total) by mouth daily   Patient not taking: Reported on 4/3/2025   doxylamine (UNISOM) 25 MG tablet  Self No No   Sig: Take 1 tablet (25 mg total) by mouth daily at bedtime as needed for sleep   metoclopramide (REGLAN) 10 mg tablet  Self No No   Sig: Take 1 tablet (10 mg total) by mouth 3 (three) times a day as needed (as needed for nausea every 8 hours)   ondansetron (ZOFRAN-ODT) 4 mg disintegrating tablet  Self No No   Sig: Take 1 tablet (4 mg total) by mouth every 6 (six) hours as needed for nausea or vomiting   pantoprazole (PROTONIX) 20 mg tablet  Self No No   Sig: Take 1 tablet (20 mg total) by mouth daily   Patient not taking: Reported on 4/10/2025   promethazine (PHENERGAN) 12.5 mg suppository  Self No No   Sig: Insert 1 suppository (12.5 mg total) into the rectum every 6 (six) hours as needed for nausea or vomiting   pyridoxine (B-6) 25 MG tablet  Self No No   Sig: Take 1 tablet (25 mg total) by mouth daily at bedtime      Facility-Administered Medications: None     Patient's Medications   Discharge Prescriptions    No medications on file     No discharge procedures on file.  ED SEPSIS DOCUMENTATION   Time reflects when diagnosis was documented in both MDM as applicable and the Disposition within this note       Time User Action Codes Description Comment    4/10/2025  6:20 PM Gurpreet Calix Add [N89.8] Vaginal discharge                  Gurpreet Calix PA-C  04/10/25 9611

## 2025-04-10 NOTE — DISCHARGE INSTRUCTIONS
We will call you when the results of the molecular vaginal panel have resulted.  We will hold off on antibiotics until we know for sure exactly what we are treating.  Return to the ER with worsening of any symptomatology please follow-up with OB.

## 2025-04-11 ENCOUNTER — RESULTS FOLLOW-UP (OUTPATIENT)
Dept: EMERGENCY DEPT | Facility: HOSPITAL | Age: 30
End: 2025-04-11

## 2025-04-11 LAB
BACTERIA UR CULT: NORMAL
C GLABRATA DNA VAG QL NAA+PROBE: NEGATIVE
C KRUSEI DNA VAG QL NAA+PROBE: NEGATIVE
CANDIDA SP 6 PNL VAG NAA+PROBE: POSITIVE
T VAGINALIS DNA VAG QL NAA+PROBE: NEGATIVE
VAGINOSIS/ITIS DNA PNL VAG PROBE+SIG AMP: NEGATIVE

## 2025-04-11 RX ORDER — CLOTRIMAZOLE 1 %
1 CREAM WITH APPLICATOR VAGINAL
Qty: 45 G | Refills: 0 | Status: SHIPPED | OUTPATIENT
Start: 2025-04-11 | End: 2025-04-18

## 2025-04-14 ENCOUNTER — PATIENT OUTREACH (OUTPATIENT)
Dept: OBGYN CLINIC | Facility: CLINIC | Age: 30
End: 2025-04-14

## 2025-04-14 NOTE — PROGRESS NOTES
Chart review indicates that CMOC spoke w pt on 4/8 and will assist with LIHEAP and OnTrack. Also discussed housing and was referred to Find Help Housing authority [employer]. She is already on housing list in [place] which is 50 min away, was advised they will have something in one year.      Notes indicate pt was in the ER at [facility] on 4/10/25 for vaginal discharge. Pt did discharge home same day.     [facility] outreached pt via [employer] to discuss above and if she was able to outreach [employer] for assistance to move into apartment. Pt states that she did receive SW e-mail with resource for financial help, she has not had time to follow up due to complications w pregnancy. Pt does note infection and use of cream. She will outreach provider if has questions or symptoms worsen. [facility] will also look into local [facility] and send to e-mail.    Pt states they were not able to afford April rent. She has not heard anything from her landlord. She is not concerned at this time that they will have to leave their apt and no need to look for alternative housing.    Pt is now connected to [employer], denies food insecurity.    Pt did not attend [facility] appt, believes appt is now on 4/24.     Pt did speak with CMOC about LIHEAP and On Track, but did not start application yet.    She denies any other needs today. [facility] will reach out to pt next week regarding above.     SWCM contacted [facility], they do service [facility] only for shelter , not rental assistance. Pt updated via e-mail and [facility] to follow up next week.

## 2025-04-15 ENCOUNTER — PATIENT OUTREACH (OUTPATIENT)
Dept: OBGYN CLINIC | Facility: CLINIC | Age: 30
End: 2025-04-15

## 2025-04-15 NOTE — PROGRESS NOTES
"OC completed the [employer] application online for the patient via compass.    Patient had already received funds from [facility]. Not sure if she will qualify again. If patient does not qualify, she wants OC to apply for \"On Track\" but patient was on this already and missed payments. Therefore, she will not qualify again.     Patient advised she applied for SNAP again after she lost it. Patient does not need OC to apply for her currently.     OC will follow up as soon as [employer] updates.      "

## 2025-04-16 ENCOUNTER — INITIAL PRENATAL (OUTPATIENT)
Dept: OBGYN CLINIC | Facility: CLINIC | Age: 30
End: 2025-04-16
Payer: COMMERCIAL

## 2025-04-16 VITALS — WEIGHT: 128 LBS | DIASTOLIC BLOOD PRESSURE: 70 MMHG | BODY MASS INDEX: 23.41 KG/M2 | SYSTOLIC BLOOD PRESSURE: 112 MMHG

## 2025-04-16 DIAGNOSIS — N83.291 OTHER OVARIAN CYST, RIGHT SIDE: ICD-10-CM

## 2025-04-16 DIAGNOSIS — E05.90 SUBCLINICAL HYPERTHYROIDISM: ICD-10-CM

## 2025-04-16 DIAGNOSIS — O21.0 HYPEREMESIS AFFECTING PREGNANCY, ANTEPARTUM: ICD-10-CM

## 2025-04-16 DIAGNOSIS — O21.0 HYPEREMESIS GRAVIDARUM: ICD-10-CM

## 2025-04-16 DIAGNOSIS — Z3A.18 18 WEEKS GESTATION OF PREGNANCY: Primary | ICD-10-CM

## 2025-04-16 PROBLEM — Z86.19 HISTORY OF CHLAMYDIA: Status: RESOLVED | Noted: 2025-03-03 | Resolved: 2025-04-16

## 2025-04-16 PROCEDURE — 87591 N.GONORRHOEAE DNA AMP PROB: CPT | Performed by: OBSTETRICS & GYNECOLOGY

## 2025-04-16 PROCEDURE — 99213 OFFICE O/P EST LOW 20 MIN: CPT | Performed by: OBSTETRICS & GYNECOLOGY

## 2025-04-16 PROCEDURE — 87491 CHLMYD TRACH DNA AMP PROBE: CPT | Performed by: OBSTETRICS & GYNECOLOGY

## 2025-04-16 RX ORDER — PYRIDOXINE HCL (VITAMIN B6) 25 MG
25 TABLET ORAL
Qty: 30 TABLET | Refills: 0 | Status: SHIPPED | OUTPATIENT
Start: 2025-04-16 | End: 2025-05-16

## 2025-04-16 NOTE — ASSESSMENT & PLAN NOTE
Lab Results   Component Value Date    [id number] 0.305 (L) 04/07/2025    TSH 0.71 10/11/2021     No symptoms no need to treat now.

## 2025-04-16 NOTE — PROGRESS NOTES
Initial Prenatal Visit  [facility] [address]    Assessment/Plan:  Hayley is a 30 y.o. year old  at 18w5d who presents for initial prenatal visit.     Supervision of normal pregnancy  - Prenatal labs reviewed and normal.  Blood type: A positive   - Aneuploidy screening discussed.  Patient opts for cell free DNA testing.  - Routine cervical cancer screening: Pap Up to date.  - Routine STI Screening: GC/Chlamydia sent today.  HIV/Hep B/Syphilis ordered in prenatal panel.  - Patient Education: Patient was counseled regarding diet, exercise, weight gain, foods to avoid, vaccines in pregnancy, aneuploidy screening, travel precautions to include seat belt use and VTE risk reduction.  She has been provided our pregnancy packet which includes how and when to contact providers, medication recommendations, dietary suggestions, breastfeeding information as well as websites for additional information, hospital and delivery concerns.       Additional Pregnancy Problems:   1. 18 weeks gestation of pregnancy  Assessment & Plan:  NIPT (care everywhere ) LOW riks   AFP normal   Early GTT normal     Level 2 is 25  Orders:  -     Chlamydia/GC amplified DNA by PCR  2. Hyperemesis affecting pregnancy, antepartum  Assessment & Plan:  Taking zofran and reglan as needed   Reports today -   3. Other ovarian cyst, right side  Assessment & Plan:  1 cm in size   4. Subclinical hyperthyroidism  Assessment & Plan:  Lab Results   Component Value Date    [id number] 0.305 (L) 2025    TSH 0.71 10/11/2021     No symptoms no need to treat now.   5. Hyperemesis gravidarum  -     pyridoxine (B-6) 25 MG tablet; Take 1 tablet (25 mg total) by mouth daily at bedtime        Subjective:   CC:  Desires prenatal care  Hayley Brown is a 30 y.o.  female who presents for prenatal care.  Pregnancy ROS:  leakage of fluid, pelvic pain, or vaginal bleeding.  Yes but getting better.   nausea/vomiting.    The following portions of the patient's history were reviewed and updated as appropriate: allergies, current medications, past family history, past medical history, obstetric history, gynecologic history, past social history, past surgical history and problem list.      Objective:  /70   Wt 58.1 kg (128 lb)   LMP  (LMP Unknown)   BMI 23.41 kg/m²   Pregravid Weight/BMI: 59.9 kg (132 lb) (BMI 24.14)  Current Weight: 58.1 kg (128 lb)   Total Weight Gain: -1.814 kg (-4 lb)   Pre- Vitals      Flowsheet Row Most Recent Value   Prenatal Assessment    Fetal Heart Rate 141   Prenatal Vitals    Blood Pressure 112/70   Weight - Scale 58.1 kg (128 lb)   Urine Albumin/Glucose    Dilation/Effacement/Station    Vaginal Drainage    Draining Fluid No   Edema    LLE Edema None   RLE Edema None   Facial Edema None           General: Well appearing, no distress  Respiratory: Normal respiratory rate, lungs clear to auscultation, no wheezing or rales  Cardiovascular: Regular rate and rhythm, no murmurs, rubs, or gallops  Breasts: Normal bilaterally, nontender without masses, asymmetry, or nipple discharge.  Abdomen: Soft, gravid, nontender  : Urethra normal. Normal labia majora and minora. Vagina normal.  No vaginal bleeding. No vaginal discharge. Cervix visually closed.   Extremities: Warm and well perfused.  Non tender.  No edema.

## 2025-04-17 LAB
C TRACH DNA SPEC QL NAA+PROBE: NEGATIVE
N GONORRHOEA DNA SPEC QL NAA+PROBE: NEGATIVE

## 2025-04-22 ENCOUNTER — PATIENT OUTREACH (OUTPATIENT)
Dept: OBGYN CLINIC | Facility: CLINIC | Age: 30
End: 2025-04-22

## 2025-04-22 NOTE — PROGRESS NOTES
OC called with [employer] line number and spoke with Dora #[id number].    Dora called patient and patient advised she was driving and could not speak, call another time.     OC was calling to let patient know the additional document needed for the Li Heap requirements in per [employer] Website:  1) Geoffrey Perea's income and resource or proof that he worked for the [employer].  2) Expenses - Utility Responsibility - Recent utility bill, lease or written statement from [facility].    This is due no later than 5/15/25. OC will follow up again with patient.

## 2025-04-23 ENCOUNTER — PATIENT OUTREACH (OUTPATIENT)
Dept: OBGYN CLINIC | Facility: CLINIC | Age: 30
End: 2025-04-23

## 2025-04-23 NOTE — PROGRESS NOTES
Chart review indicates that CMOC assisted patient with LIHEAP application online. Pt will not qualify for [facility] as she was on prior and missed payments, therefore will not qualify again. Pt on housing list in [facility], was directed to [facility] authority [employer] if she would like to apply there.     [facility] outreached pt to discuss above and to check in on progress with [employer] and [facility]. She did not , VM left. SWCM to follow.

## 2025-04-29 ENCOUNTER — TELEPHONE (OUTPATIENT)
Dept: FAMILY MEDICINE CLINIC | Facility: CLINIC | Age: 30
End: 2025-04-29

## 2025-04-29 DIAGNOSIS — Z91.89 NEED FOR DENTAL CARE: Primary | ICD-10-CM

## 2025-04-29 NOTE — TELEPHONE ENCOUNTER
Patient would like a letter stating that she could be seen at a dentist for a cleaning. She stated that she has an appointment at 2 PM. Letter was provided for patient.

## 2025-04-30 ENCOUNTER — PATIENT OUTREACH (OUTPATIENT)
Dept: OBGYN CLINIC | Facility: CLINIC | Age: 30
End: 2025-04-30

## 2025-04-30 NOTE — PROGRESS NOTES
Chart review indicates that CMOC assisted patient with LIHEAP application online. Pt will not qualify for [facility] as she was on prior and missed payments, therefore will not qualify again. Pt on housing list in [facility], was directed to [facility] authority [place] if she would like to apply there. CMOC continuing to follow for connection to [employer] as documentation needed by 5/15/25.    [facility] had outreached pt on 4/23/25 to follow up on Economic Opportunity application and WIC, no return call yet.     [facility] will review CMOC notes next week and outreach pt. [facility] to follow.

## 2025-05-04 ENCOUNTER — NURSE TRIAGE (OUTPATIENT)
Dept: OBGYN CLINIC | Facility: CLINIC | Age: 30
End: 2025-05-04

## 2025-05-04 NOTE — PROGRESS NOTES
Please refer to the [facility] ultrasound report in Ob Procedures for additional information regarding today's visit

## 2025-05-05 ENCOUNTER — ROUTINE PRENATAL (OUTPATIENT)
Dept: PERINATAL CARE | Facility: OTHER | Age: 30
End: 2025-05-05
Attending: ADVANCED PRACTICE MIDWIFE
Payer: COMMERCIAL

## 2025-05-05 VITALS
HEART RATE: 106 BPM | BODY MASS INDEX: 23.96 KG/M2 | HEIGHT: 62 IN | WEIGHT: 130.2 LBS | SYSTOLIC BLOOD PRESSURE: 100 MMHG | DIASTOLIC BLOOD PRESSURE: 70 MMHG

## 2025-05-05 DIAGNOSIS — Z3A.21 21 WEEKS GESTATION OF PREGNANCY: ICD-10-CM

## 2025-05-05 DIAGNOSIS — Z36.86 ENCOUNTER FOR ANTENATAL SCREENING FOR CERVICAL LENGTH: ICD-10-CM

## 2025-05-05 DIAGNOSIS — Z3A.15 15 WEEKS GESTATION OF PREGNANCY: ICD-10-CM

## 2025-05-05 DIAGNOSIS — Z36.3 ENCOUNTER FOR ANTENATAL SCREENING FOR MALFORMATIONS: Primary | ICD-10-CM

## 2025-05-05 PROCEDURE — 76817 TRANSVAGINAL US OBSTETRIC: CPT | Performed by: OBSTETRICS & GYNECOLOGY

## 2025-05-05 PROCEDURE — 99243 OFF/OP CNSLTJ NEW/EST LOW 30: CPT | Performed by: OBSTETRICS & GYNECOLOGY

## 2025-05-05 PROCEDURE — 76805 OB US >/= 14 WKS SNGL FETUS: CPT | Performed by: OBSTETRICS & GYNECOLOGY

## 2025-05-05 NOTE — TELEPHONE ENCOUNTER
[facility] reply in agreement to plan  Answer Assessment - Initial Assessment Questions  See my chart message- created to document esc    Protocols used: No Contact or Duplicate Contact Call-Adult-OH

## 2025-05-05 NOTE — PROGRESS NOTES
Ultrasound Probe Disinfection    A transvaginal ultrasound was performed.   Prior to use, disinfection was performed with [employer] ([employer]).  Probe serial number [id number]: [id number]  was used.    Chaperone: Shanell Winter RDMS

## 2025-05-05 NOTE — LETTER
May 5, 2025     Good Rangel MD  [address] [place] [address]    Patient: Hayley Brown   YOB: 1995   Date of Visit: 5/5/2025       Dear Dr. Good Rangel MD:    Thank you for referring Hayley Brown to me for evaluation. Below are my notes for this consultation.    If you have questions, please do not hesitate to call me. I look forward to following your patient along with you.         Sincerely,        Javier Santana MD        CC: No Recipients    Javier Santana MD  5/5/2025  1:53 PM  Sign when Signing Visit  Please refer to the [facility] ultrasound report in Ob Procedures for additional information regarding today's visit

## 2025-05-08 ENCOUNTER — PATIENT OUTREACH (OUTPATIENT)
Dept: OBGYN CLINIC | Facility: CLINIC | Age: 30
End: 2025-05-08

## 2025-05-08 NOTE — PROGRESS NOTES
Chart review indicates that CMOC assisted patient with LIHEAP application online. Pt will not qualify for [facility] as she was on prior and missed payments, therefore will not qualify again. Pt on housing list in [facility], was directed to [facility] authority [place] if she would like to apply there. CMOC continuing to follow for connection to [employer] as documentation needed by 5/15/25.CMOC did try to get in touch w pt on 5/30/25 and was unsuccessful.      [facility] had outreached pt on 4/23/25 to follow up on Economic Opportunity application and WIC, no return call yet. [facility] attempted to outreach pt again today via [email or web address]  ID# [id number], pt answered and stated that she was busy and unable to speak at this time, requested [facility] call back at another time. [facility] to follow.

## 2025-05-08 NOTE — PROGRESS NOTES
OC called patient with  [employer] #[id number], and we left a voice message.    OC was calling to let patient know the additional document needed for the Li Heap requirements in per [employer] Website:  1) Geoffrey Perea's income and resource or proof that he worked for the [employer].  2) Expenses - Utility Responsibility - Recent utility bill, lease or written statement from [facility].     This is due no later than 5/15/25. OC sent patient a secure text message. OC will follow up again with patient next week on 5/13/25.

## 2025-05-12 PROBLEM — Z3A.22 22 WEEKS GESTATION OF PREGNANCY: Status: ACTIVE | Noted: 2025-03-17

## 2025-05-13 ENCOUNTER — NURSE TRIAGE (OUTPATIENT)
Age: 30
End: 2025-05-13

## 2025-05-13 ENCOUNTER — PATIENT OUTREACH (OUTPATIENT)
Dept: OBGYN CLINIC | Facility: CLINIC | Age: 30
End: 2025-05-13

## 2025-05-13 ENCOUNTER — ROUTINE PRENATAL (OUTPATIENT)
Dept: OBGYN CLINIC | Facility: CLINIC | Age: 30
End: 2025-05-13
Payer: COMMERCIAL

## 2025-05-13 ENCOUNTER — PATIENT MESSAGE (OUTPATIENT)
Dept: OBGYN CLINIC | Facility: CLINIC | Age: 30
End: 2025-05-13

## 2025-05-13 VITALS — SYSTOLIC BLOOD PRESSURE: 110 MMHG | WEIGHT: 136.8 LBS | DIASTOLIC BLOOD PRESSURE: 70 MMHG | BODY MASS INDEX: 25.02 KG/M2

## 2025-05-13 DIAGNOSIS — O26.852 SPOTTING AFFECTING PREGNANCY IN SECOND TRIMESTER: ICD-10-CM

## 2025-05-13 DIAGNOSIS — Z34.92 SECOND TRIMESTER PREGNANCY: Primary | ICD-10-CM

## 2025-05-13 DIAGNOSIS — E05.90 SUBCLINICAL HYPERTHYROIDISM: ICD-10-CM

## 2025-05-13 DIAGNOSIS — Z3A.22 22 WEEKS GESTATION OF PREGNANCY: ICD-10-CM

## 2025-05-13 PROBLEM — N93.0 POSTCOITAL BLEEDING: Status: ACTIVE | Noted: 2025-05-13

## 2025-05-13 PROCEDURE — 99213 OFFICE O/P EST LOW 20 MIN: CPT | Performed by: ADVANCED PRACTICE MIDWIFE

## 2025-05-13 NOTE — TELEPHONE ENCOUNTER
"FOLLOW UP: n/a    REASON FOR CONVERSATION: Vaginal Bleeding and Pregnancy Problem    SYMPTOMS: spotting after intercourse    OTHER: [id number]--looowzlva of 2 episodes of light pink spotting yesterday. Last intercourse  and over weekend. Advised this can be common. Call back if bleeding increases, continues past 24 hours, gushes of fluid. Patient verbalized understanding.     DISPOSITION: No disposition on file.  Reason for Disposition   Slight SPOTTING after sexual intercourse (single or brief episode)    Answer Assessment - Initial Assessment Questions  1. ONSET: \"When did this bleeding start?\"         yesterday  2. BLEEDING SEVERITY: \"Describe the bleeding that you are having.\" \"How much bleeding is there?\"       Light pink spotting yesterday  3. ABDOMEN PAIN: \"Do you have any pain?\" \"How bad is the pain?\"  (e.g., Scale 0-10; none, mild, moderate, or severe)      Denies currently  4. PREGNANCY: \"Do you know how many weeks or months pregnant you are?\"       22w4d  5. LINH: \"What date are you expecting to deliver?\"           7. HIGH-RISK PREGNANCY:  \"Have been told by your doctor that you have a high-risk condition that can cause bleeding?\"  (e.g., placenta previa, vasa previa)       denies  8. ULTRASOUND: \"Have you had an ultrasound during this pregnancy?\"  Note: To confirm intrauterine pregnancy, placenta location.      yes  9. HEMODYNAMIC STATUS: \"Are you weak or feeling lightheaded?\" If Yes, ask: \"Can you stand and walk normally?\"       denies  10. OTHER SYMPTOMS: \"What other symptoms are you having with the bleeding?\" (e.g., leaking fluid from vagina, contractions)        denies    Protocols used: Pregnancy - Vaginal Bleeding Greater Than 20 Weeks EGA-Adult-[place]    "

## 2025-05-13 NOTE — TELEPHONE ENCOUNTER
Regardinw spotting  ----- Message from Flor BAZZI sent at 2025  9:59 AM EDT -----  Pt is 22 w and is having some spotting. She sent in a picture. She also has an apt today that needs to be canceled. I did take her out.

## 2025-05-13 NOTE — PROGRESS NOTES
OC called Noa Valencia #[id number] regarding documents needed for patient's applications.     Erik advised OC, patient could not hear him because she was in the hair salon, and it was too loud. OC has reached out several times to advised patient of the requirements needed. OC instructed  if patient is interested in knowing the requirements for [employer] please call OC back. Call was disconnected.     Requirements are due no later than 5/15/25. If OC does not hear from patient on Thursday, she will close case and reopen if patient reaches out.

## 2025-05-13 NOTE — ASSESSMENT & PLAN NOTE
Negative blood noted in vagina  Recommend no intercourse   To call if having bright red bleeding, pelvic pain, or change in discharge

## 2025-05-13 NOTE — ASSESSMENT & PLAN NOTE
Reviewed second trimester precautionsNIPT (care everywhere ) LOW riks   AFP normal   Early GTT normal   Growth scan 6/24/2025  - next visit 4 weeks

## 2025-05-13 NOTE — PROGRESS NOTES
Name: Hayley Brown      : 1995      MRN: [id number]  Encounter Provider: Martha Horton CNM  Encounter Date: 2025   Encounter department: [facility]  :  Assessment & Plan  Second trimester pregnancy         22 weeks gestation of pregnancy  Reviewed second trimester precautionsNIPT (care everywhere ) LOW riks   AFP normal   Early GTT normal   Growth scan 2025  - next visit 4 weeks         Spotting affecting pregnancy in second trimester  Negative blood noted in vagina  Recommend no intercourse   To call if having bright red bleeding, pelvic pain, or change in discharge       Subclinical hyperthyroidism  Repeating test this week  Orders:    TSH, 3rd generation; Future    T4, free; Future        History of Present Illness   Hayley Brown is a 30 y.o.  female who presents for routine prenatal care at 22w4d.  Pregnancy ROS: no leakage of fluid, pelvic pain.  Good fetal movement.     States that she has had bleeding with intercourse before. Notes that she had a pink tinged discharge with wiping yesterday x 2. Last intercourse  and over weekend. No change in activity yesterday.     Speculum exam: no blood visible in the vaginal. No bleeding or friability of the cervix. Cx is closed and firm.   Most recent ultrasound 25- negative for previa, normal anterior placenta      Objective:  /70   Wt 62.1 kg (136 lb 12.8 oz)   LMP  (LMP Unknown)   BMI 25.02 kg/m²   Pregravid Weight/BMI: 59.9 kg (132 lb) (BMI 24.14)  Current Weight: 62.1 kg (136 lb 12.8 oz)   Total Weight Gain: 2.177 kg (4 lb 12.8 oz)   Pre- Vitals    Flowsheet Row Most Recent Value   Prenatal Assessment    Fetal Heart Rate 142   Movement Present   Prenatal Vitals    Blood Pressure 110/70   Weight - Scale 62.1 kg (136 lb 12.8 oz)   Urine Albumin/Glucose    Dilation/Effacement/Station    Vaginal Drainage    Edema    LLE Edema None   RLE Edema None          Hayley Brown is a  30 y.o. female who presents prenatal visit  History obtained from: patient    Review of Systems   Constitutional:  Negative for chills and fever.   Genitourinary:  Negative for difficulty urinating, dysuria, frequency, pelvic pain, urgency and vaginal pain.        Noted pink tinged discharge when wiping this morning. Also noted last week after intercourse.    Neurological:  Negative for dizziness and headaches.     Medical History Reviewed by provider this encounter:     .  Current Outpatient Medications on File Prior to Visit   Medication Sig Dispense Refill    doxylamine (UNISOM) 25 MG tablet Take 1 tablet (25 mg total) by mouth daily at bedtime as needed for sleep 30 tablet 0    Prenatal Vit-Fe Fumarate-FA (PRENATAL PO) Take by mouth      pyridoxine (B-6) 25 MG tablet Take 1 tablet (25 mg total) by mouth daily at bedtime 30 tablet 0    cetirizine (ZyrTEC) 10 mg tablet Take 1 tablet (10 mg total) by mouth daily (Patient not taking: Reported on 4/3/2025) 90 tablet 1    metoclopramide (REGLAN) 10 mg tablet Take 1 tablet (10 mg total) by mouth 3 (three) times a day as needed (as needed for nausea every 8 hours) (Patient not taking: Reported on 4/16/2025) 30 tablet 0    ondansetron (ZOFRAN-ODT) 4 mg disintegrating tablet Take 1 tablet (4 mg total) by mouth every 6 (six) hours as needed for nausea or vomiting (Patient not taking: Reported on 5/5/2025) 20 tablet 0    pantoprazole (PROTONIX) 20 mg tablet Take 1 tablet (20 mg total) by mouth daily (Patient not taking: Reported on 4/10/2025) 30 tablet 0    promethazine (PHENERGAN) 12.5 mg suppository Insert 1 suppository (12.5 mg total) into the rectum every 6 (six) hours as needed for nausea or vomiting 12 each 0     No current facility-administered medications on file prior to visit.         Objective   /70   Wt 62.1 kg (136 lb 12.8 oz)   LMP  (LMP Unknown)   BMI 25.02 kg/m²      Physical Exam  Vitals reviewed.   Pulmonary:      Effort: Pulmonary effort is  normal.   Genitourinary:     Labia:         Right: No rash, tenderness or lesion.         Left: No rash, tenderness or lesion.       Urethra: No urethral pain, urethral swelling or urethral lesion.      Vagina: No signs of injury. No vaginal discharge, erythema, tenderness, bleeding or lesions.      Cervix: No cervical motion tenderness, discharge, friability, lesion, erythema or cervical bleeding.   Neurological:      Mental Status: She is alert and oriented to person, place, and time.   Psychiatric:         Mood and Affect: Mood normal.         Behavior: Behavior normal.

## 2025-05-14 ENCOUNTER — TELEPHONE (OUTPATIENT)
Dept: OBGYN CLINIC | Facility: MEDICAL CENTER | Age: 30
End: 2025-05-14

## 2025-05-14 NOTE — TELEPHONE ENCOUNTER
Per provider's request, patient contacted to f/u on yesterday's visit regarding postcoital spotting. Patient reports has not had any spotting/bleeding since Monday. Per provider's recommendation, patient made aware to refrain from having intercourse for now and see if there is any further bleeding. Precautions reviewed. Patient aware to call office with any questions or concerns. Patient verbalized understanding. No further questions at this time.

## 2025-05-15 ENCOUNTER — PATIENT OUTREACH (OUTPATIENT)
Dept: OBGYN CLINIC | Facility: CLINIC | Age: 30
End: 2025-05-15

## 2025-05-15 ENCOUNTER — OFFICE VISIT (OUTPATIENT)
Dept: FAMILY MEDICINE CLINIC | Facility: CLINIC | Age: 30
End: 2025-05-15
Payer: COMMERCIAL

## 2025-05-15 VITALS
TEMPERATURE: 96.3 F | HEIGHT: 62 IN | HEART RATE: 105 BPM | WEIGHT: 133.4 LBS | SYSTOLIC BLOOD PRESSURE: 96 MMHG | OXYGEN SATURATION: 96 % | BODY MASS INDEX: 24.55 KG/M2 | DIASTOLIC BLOOD PRESSURE: 42 MMHG

## 2025-05-15 DIAGNOSIS — R10.13 ACUTE EPIGASTRIC PAIN: ICD-10-CM

## 2025-05-15 DIAGNOSIS — Z00.00 ANNUAL PHYSICAL EXAM: Primary | ICD-10-CM

## 2025-05-15 PROCEDURE — 99213 OFFICE O/P EST LOW 20 MIN: CPT | Performed by: PHYSICIAN ASSISTANT

## 2025-05-15 PROCEDURE — 99395 PREV VISIT EST AGE 18-39: CPT | Performed by: PHYSICIAN ASSISTANT

## 2025-05-15 NOTE — PATIENT INSTRUCTIONS
"Patient Education     Routine physical for adults   The Basics   Written by the doctors and editors at [facility]   What is a physical? -- A physical is a routine visit, or \"check-up,\" with your doctor. You might also hear it called a \"wellness visit\" or \"preventive visit.\"  During each visit, the doctor will:   Ask about your physical and mental health   Ask about your habits, behaviors, and lifestyle   Do an exam   Give you vaccines if needed   Talk to you about any medicines you take   Give advice about your health   Answer your questions  Getting regular check-ups is an important part of taking care of your health. It can help your doctor find and treat any problems you have. But it's also important for preventing health problems.  A routine physical is different from a \"sick visit.\" A sick visit is when you see a doctor because of a health concern or problem. Since physicals are scheduled ahead of time, you can think about what you want to ask the doctor.  How often should I get a physical? -- It depends on your age and health. In general, for people age 21 years and older:   If you are younger than 50 years, you might be able to get a physical every 3 years.   If you are 50 years or older, your doctor might recommend a physical every year.  If you have an ongoing health condition, like diabetes or high blood pressure, your doctor will probably want to see you more often.  What happens during a physical? -- In general, each visit will include:   Physical exam - The doctor or nurse will check your height, weight, heart rate, and blood pressure. They will also look at your eyes and ears. They will ask about how you are feeling and whether you have any symptoms that bother you.   Medicines - It's a good idea to bring a list of all the medicines you take to each doctor visit. Your doctor will talk to you about your medicines and answer any questions. Tell them if you are having any side effects that bother you. You " "should also tell them if you are having trouble paying for any of your medicines.   Habits and behaviors - This includes:   Your diet   Your exercise habits   Whether you smoke, drink alcohol, or use drugs   Whether you are sexually active   Whether you feel safe at home  Your doctor will talk to you about things you can do to improve your health and lower your risk of health problems. They will also offer help and support. For example, if you want to quit smoking, they can give you advice and might prescribe medicines. If you want to improve your diet or get more physical activity, they can help you with this, too.   Lab tests, if needed - The tests you get will depend on your age and situation. For example, your doctor might want to check your:   Cholesterol   Blood sugar   Iron level   Vaccines - The recommended vaccines will depend on your age, health, and what vaccines you already had. Vaccines are very important because they can prevent certain serious or deadly infections.   Discussion of screening - \"Screening\" means checking for diseases or other health problems before they cause symptoms. Your doctor can recommend screening based on your age, risk, and preferences. This might include tests to check for:   Cancer, such as breast, prostate, cervical, ovarian, colorectal, prostate, lung, or skin cancer   Sexually transmitted infections, such as chlamydia and gonorrhea   Mental health conditions like depression and anxiety  Your doctor will talk to you about the different types of screening tests. They can help you decide which screenings to have. They can also explain what the results might mean.   Answering questions - The physical is a good time to ask the doctor or nurse questions about your health. If needed, they can refer you to other doctors or specialists, too.  Adults older than 65 years often need other care, too. As you get older, your doctor will talk to you about:   How to prevent falling at " home   Hearing or vision tests   Memory testing   How to take your medicines safely   Making sure that you have the help and support you need at home  All topics are updated as new evidence becomes available and our peer review process is complete.  This topic retrieved from [employer] on: May 02, 2024.  Topic 243802 Version 1.0  Release: 32.4.3 - C32.122  © 2024 UpToDate, Inc. and/or its affiliates. All rights reserved.  Consumer Information Use and Disclaimer   Disclaimer: This generalized information is a limited summary of diagnosis, treatment, and/or medication information. It is not meant to be comprehensive and should be used as a tool to help the user understand and/or assess potential diagnostic and treatment options. It does NOT include all information about conditions, treatments, medications, side effects, or risks that may apply to a specific patient. It is not intended to be medical advice or a substitute for the medical advice, diagnosis, or treatment of a health care provider based on the health care provider's examination and assessment of a patient's specific and unique circumstances. Patients must speak with a health care provider for complete information about their health, medical questions, and treatment options, including any risks or benefits regarding use of medications. This information does not endorse any treatments or medications as safe, effective, or approved for treating a specific patient. UpToDate, Inc. and its affiliates disclaim any warranty or liability relating to this information or the use thereof.The use of this information is governed by the Terms of Use, available at https://www.woltersASPIRE Beveragesuwer.com/en/know/clinical-effectiveness-terms. 2024© UpToDate, Inc. and its affiliates and/or licensors. All rights reserved.  Copyright   © 2024 UpToDate, Inc. and/or its affiliates. All rights reserved.    Patient Education     Examen físico de rutina para adultos   Conceptos Básicos  "  Redactado por los médicos y editores de [employer]   ¿Qué es un examen físico? -- Un examen físico es luther consulta de rutina o \"revisión\" con ryan médico. También se conoce lora \"consulta de bienestar\" o \"consulta preventiva\".  Joshua cada consulta, el médico hará lo siguiente:   Preguntará por rayn lin física y mental   Preguntará sobre ivis hábitos, conductas y estilo de gabriel   Le hará un examen   Le administrará las vacunas que renetta necesarias   Hablará con usted sobre cualquier medicina que tome   Le dará consejos sobre ryan lin   Responderá ivis preguntas  Hacerse revisiones periódicas es luther parte importante del cuidado de ryan lin. Puede ayudar a ryan médico a encontrar y tratar cualquier problema que tenga. Mahendra también es importante para prevenir problemas de lin.  Un examen físico de rutina es diferente de luther \"consulta por enfermedad\". Luther consulta por enfermedad es cuando lo atiende un médico debido a un problema o inquietud de lin. Dado que los exámenes físicos se programan con anticipación, usted puede pensar en lo que quiere preguntarle al médico.  ¿Con qué frecuencia isidro hacerme un examen físico? -- Depende de ryan edad y de ryan estado de lin. En general, en el maureen de las personas mayores de 21 años:   Si tiene menos de 50 años, es posible que pueda hacerse un examen físico cada 3 años.   Si tiene 50 años o más, ryan médico podría recomendarle un examen físico cada año.  Si tiene un padecimiento de lin crónico, irving lora diabetes o presión arterial kenya, ryan médico probablemente querrá verlo con más frecuencia.  ¿Qué sucede joshua un examen físico? -- En general, cada consulta incluirá:   Examen físico - El médico o enfermero revisará rayn estatura, peso, frecuencia cardíaca y presión arterial. También le examinará los ojos y los oídos. Le preguntará cómo se siente y si tiene algún síntoma que le moleste.   Medicinas - Es luther buena idea llevar luther lista de todos las medicinas que hazel cada vez que acude " "a la consulta médica. Ryan médico le hablará sobre litzy medicinas y responderá a litzy preguntas. Dígale si tiene algún efecto secundario que le moleste. También debe informarle si tiene dificultades para pagar alguna de litzy medicinas.   Hábitos y comportamientos - [place] incluye:   Ryan dieta   Litzy hábitos de ejercicio   Si fuma, surya alcohol o consume drogas   Si es sexualmente activo   Si se siente seguro en casa  Ryan médico hablará con usted sobre las cosas que puede hacer para mejorar ryan lin y reducir el riesgo de tener problemas de lin. También ofrecerá ayuda y apoyo. Por ejemplo, si quiere dejar de fumar, puede darle consejos y recetarle medicinas. Si quiere mejorar ryan alimentación o realizar más actividad física, ryan médico también puede ayudarlo a lograr estos objetivos.   Pruebas de laboratorio, si son necesarias - Las pruebas que le realicen dependerán de ryan edad y situación. Por ejemplo, es posible que ryan médico quiera revisar ryan:   Colesterol   Azúcar en any   Nivel de jasmin   Vacunas - Las vacunas recomendadas dependerán de ryan edad, ryan lin y de las vacunas que ya haya recibido. Las vacunas son muy importantes porque pueden prevenir ciertas infecciones graves o mortales.   Análisis sobre las pruebas de detección - \"Detección\" significa revisar si hay enfermedades u otros problemas de lin antes de que causen síntomas. Ryan médico puede recomendar pruebas de detección según ryan edad, riesgo y preferencias. [place] podría incluir pruebas para detectar:   Cáncer, lora cáncer de seno, próstata, quinn uterino, ovario, colorrectal, próstata, pulmón o piel   Infecciones de transmisión sexual tales lora clamidia y gonorrea   Padecimientos de lin mental tales lora depresión y ansiedad.  El médico le hablará sobre los diferentes tipos de pruebas de detección. Puede ayudarlo a decidir qué pruebas de detección debe hacerse. También le puede explicar lo que podrían significar los resultados.   Responder preguntas - El " examen físico es un buen momento para hacerle preguntas al médico o enfermero sobre ryan lin. Si es necesario, también puede derivarlo a otros médicos o especialistas.  Los adultos mayores de 65 años a menudo también necesitan otros cuidados. A medida que envejece, ryan médico hablará con usted sobre:   Cómo evitar las caídas en el hogar   Pruebas de audición o visión   Pruebas de memoria   Cómo lisa ivis medicinas de manera hager   Asegurarse de tener la ayuda y el apoyo que necesita en casa  Todos los artículos se actualizan a medida que se descubre nueva evidencia y culmina nuestro proceso de evaluación por homólogos   Donovan artículo se recuperó de [employer] el: May 02, 2024.  Artículo [id number] Versión [id number].es-419.1  Release: 32.4.3 - C32.122  © 2024 UpToDate, Inc. Todos los derechos reservados.  Exención de responsabilidad y uso de la información del consumidor   Descargo de responsabilidad: esta información generalizada es un resumen limitado de información sobre el diagnóstico, el tratamiento y/o los medicamentos. No pretende ser exhaustiva y se debe utilizar lora herramienta para ayudar al usuario a comprender y/o evaluar las posibles opciones de diagnóstico y tratamiento. No incluye toda la información sobre afecciones, tratamientos, medicamentos, efectos secundarios o riesgos puedan ser aplicables a un paciente específico. No tiene el propósito de servir lora recomendación médica ni de sustituir la recomendación médica, el diagnóstico o el tratamiento de un profesional de atención médica que se base en el examen y la evaluación de donovan profesional de la lin respecto a las circunstancias específicas y únicas del paciente. Los pacientes deben hablar con un profesional de atención médica para obtener información completa sobre ryan lin, cuestiones médicas y opciones de tratamiento, incluidos los riesgos o los beneficios relacionados con el uso de medicamentos. Esta información no certifica que los tratamientos o  medicamentos renetta seguros, eficaces o estén aprobados para tratar a un paciente específico. [employer] y ivis afiliados renuncian a cualquier garantía o responsabilidad relacionada con esta información o el uso de la misma.El uso de esta información está sujeto a las Condiciones de uso, disponibles en https://www.Connectlouduwer.com/en/know/clinical-effectiveness-terms. 2024© 51 [employer] y ivis afiliados y/o licenciantes. Todos los derechos reservados.  Copyright   © 2024 [employer] Todos los derechos reservados.

## 2025-05-15 NOTE — PROGRESS NOTES
IB received from CM that she has made several attempts to reach/inform pt of requirements due for applications. CMOC to close case this week if she does not receive follow up from patient. CM to follow and outreach pt next week.     Chart reviewed.

## 2025-05-15 NOTE — PROGRESS NOTES
Adult Annual Physical  Name: Hayley Brown      : 1995      MRN: [id number]  Encounter Provider: Sony Patterson PA-C  Encounter Date: 5/15/2025   Encounter department: [facility]    :  Assessment & Plan  Annual physical exam     - 2023 last PAP smear, next due 2026       Acute epigastric pain     - Pt noted 3 day hx of intermittent burning epigastric pain    - Denies radiation of pain, nausea, vomiting, diarrhea dysuria,   - Pain is worsened with meals, especially spicy foods   - Abdominal EXAM WNL, suspect GERD    - recommended Tums PRN and to RTC if symptoms worsen or fail to improve       Annual physical exam               Preventive Screenings:  - Diabetes Screening: screening up-to-date  - Cholesterol Screening: screening not indicated   - Hepatitis C screening: screening up-to-date   - HIV screening: screening up-to-date   - Cervical cancer screening: screening up-to-date   - Colon cancer screening: screening not indicated   - Lung cancer screening: screening not indicated     Counseling/Anticipatory Guidance:    - Dental health: discussed importance of regular tooth brushing, flossing, and dental visits.   - Sexual health: discussed sexually transmitted diseases, partner selection, use of condoms, avoidance of unintended pregnancy, and contraceptive alternatives.   - Diet: discussed recommendations for a healthy/well-balanced diet.   - Exercise: the importance of regular exercise/physical activity was discussed. Recommend exercise 3-5 times per week for at least 30 minutes.          History of Present Illness     Adult Annual Physical:  Patient presents for annual physical.     Diet and Physical Activity:  - Diet/Nutrition: no special diet and limited junk food.  - Exercise: walking, 1-2 times a week on average and 30-60 minutes on average.    Depression Screening:  - PHQ-2 Score: 2    General Health:  - Sleep: sleeps well and 4-6 hours of sleep on  "average.  - Hearing: normal hearing bilateral ears.  - Vision: no vision problems and most recent eye exam > 1 year ago.  - Dental: regular dental visits, brushes teeth twice daily and floss regularly.    /GYN Health:  - Follows with GYN: yes.   - History of STDs: no    Advanced Care Planning:  - Has an advanced directive?: no    - Has a durable medical POA?: no      Review of Systems   Constitutional:  Negative for fatigue and fever.   Respiratory:  Negative for cough and shortness of breath.    Cardiovascular:  Negative for chest pain.   Gastrointestinal:  Positive for abdominal pain. Negative for diarrhea, nausea and vomiting.   Neurological:  Negative for headaches.     Medical History Reviewed by provider this encounter:  Tobacco  Allergies  Meds  Problems  Med Hx  Surg Hx  Fam Hx     .    Objective   BP (!) 96/42   Pulse 105   Temp (!) 96.3 °F (35.7 °C)   Ht 5' 2\" (1.575 m)   Wt 60.5 kg (133 lb 6.4 oz)   LMP  (LMP Unknown)   SpO2 96%   BMI 24.40 kg/m²     Physical Exam  Constitutional:       Appearance: Normal appearance.   HENT:      Head: Normocephalic.      Right Ear: External ear normal.      Left Ear: External ear normal.      Nose: Nose normal.      Mouth/Throat:      Mouth: Mucous membranes are moist.      Pharynx: Oropharynx is clear.     Eyes:      Conjunctiva/sclera: Conjunctivae normal.       Cardiovascular:      Rate and Rhythm: Normal rate and regular rhythm.      Heart sounds: Normal heart sounds.   Pulmonary:      Effort: Pulmonary effort is normal.      Breath sounds: Normal breath sounds.   Abdominal:      General: Bowel sounds are normal.      Palpations: Abdomen is soft.      Comments: Palpable fundus     Neurological:      Mental Status: She is alert and oriented to person, place, and time.     Psychiatric:         Behavior: Behavior normal.       "

## 2025-05-20 ENCOUNTER — PATIENT OUTREACH (OUTPATIENT)
Dept: OBGYN CLINIC | Facility: CLINIC | Age: 30
End: 2025-05-20

## 2025-05-20 DIAGNOSIS — O21.0 HYPEREMESIS GRAVIDARUM: ICD-10-CM

## 2025-05-20 NOTE — PROGRESS NOTES
OC called [employer] line and spoke with  Terrie #[phone].    OC has reached out to patient several times. If patient answered the phone, it was not a good time for her to speak with me or OC just left a voice message. OC advised patient of the requirements, and they were due 5/15/25.     OC advised patient via voice message she is closing the case. No additional needs for OC to assist with currently. Case to be reopened if patient reach's out, case closed.

## 2025-05-21 ENCOUNTER — PATIENT OUTREACH (OUTPATIENT)
Dept: OBGYN CLINIC | Facility: CLINIC | Age: 30
End: 2025-05-21

## 2025-05-21 NOTE — PROGRESS NOTES
IB received from [facility] that closing case due to lack of connection with patient. Chart reviewed including prior [facility] and SW notes. [facility] outreached pt today via [employer]  ID#[id number], pt did not answer, VM left. At this time due to multiple unsuccessful attempts to reach pt, SW will send UTR letter via [employer]. Will close case in two weeks if have not heard back from patient. [facility] to follow.

## 2025-05-21 NOTE — LETTER
[address] [place] [address]    Re: Care Coordination   5/21/2025       Dear Hayley,    I tried to reach you by phone on 5/8/25 and 5/21/25 and was unfortunately unable to reach you. You can contact me, the Care Manager at  [facility] [place] for connection to resources in the community at p, [phone]. Thank you.      Sincerely,         Rosalba Sams LCSW

## 2025-05-23 RX ORDER — PYRIDOXINE HCL (VITAMIN B6) 25 MG
25 TABLET ORAL
Qty: 30 TABLET | Refills: 0 | Status: SHIPPED | OUTPATIENT
Start: 2025-05-23 | End: 2025-06-22

## 2025-06-03 ENCOUNTER — PATIENT OUTREACH (OUTPATIENT)
Dept: OBGYN CLINIC | Facility: CLINIC | Age: 30
End: 2025-06-03

## 2025-06-04 PROBLEM — Z3A.25 25 WEEKS GESTATION OF PREGNANCY: Status: ACTIVE | Noted: 2025-03-17

## 2025-06-17 PROBLEM — Z3A.28 28 WEEKS GESTATION OF PREGNANCY: Status: ACTIVE | Noted: 2025-03-17

## 2025-06-17 NOTE — PROGRESS NOTES
"Routine Prenatal Visit  [facility] [address] [place] [place]      OB/GYN Prenatal Visit    ASSESSMENT / PLAN:  1. 28 weeks gestation of pregnancy  Assessment & Plan:  NIPT and AFP is low risk   Growth 25  Orders:  -     Glucose, 1H PG; Future  -     RPR-Syphilis Screening (Total Syphilis IGG/IGM); Future  -     CBC and differential; Future  -     Anemia Panel w/Reflex, OB; Future  2. Subclinical hyperthyroidism  Assessment & Plan:  Lab Results   Component Value Date    HLW3BJLZBPQC 0.305 (L) 2025    TSH 0.71 10/11/2021     No symptoms no need to treat now.   Repeat with 28 week labs.   3. Encounter for immunization  -     Tdap Vaccine greater than or equal to 8yo        SUBJECTIVE:  Hayley Brown is a 30 y.o.  at 28 here for prenatal visit.  She has no obstetric complaints and denies pelvic pain, cramping/contractions, vaginal bleeding, loss of fluid, or decreased fetal movement.       The following portions of the patient's history were reviewed and updated as appropriate: allergies, current medications, past family history, past medical history, obstetric history, gynecologic history, past social history, past surgical history and problem list.      Objective:  /72   Ht 5' 2\" (1.575 m)   Wt 63.5 kg (140 lb)   LMP  (LMP Unknown)   BMI 25.61 kg/m²   Pregravid Weight/BMI: 59.9 kg (132 lb) (BMI 24.14)  Current Weight: 63.5 kg (140 lb)   Total Weight Gain: 3.629 kg (8 lb)   Pre- Vitals      Flowsheet Row Most Recent Value   Prenatal Assessment    Fetal Heart Rate 150   Movement Present   Prenatal Vitals    Blood Pressure 110/72   Weight - Scale 63.5 kg (140 lb)   Urine Albumin/Glucose    Dilation/Effacement/Station    Vaginal Drainage    Draining Fluid No   Edema    LLE Edema Trace   RLE Edema Trace   Facial Edema None               Physical Exam:    General: Well appearing, no distress  Respiratory: Unlabored breathing  Cardiovascular: Regular " rate.  Abdomen: Soft, gravid, nontender  Fundal Height: Appropriate for gestational age.  Extremities: Warm and well perfused.  Non tender.      Good Rangel MD

## 2025-06-17 NOTE — ASSESSMENT & PLAN NOTE
Lab Results   Component Value Date    [id number] 0.305 (L) 04/07/2025    TSH 0.71 10/11/2021     No symptoms no need to treat now.   Repeat with 28 week labs.

## 2025-06-19 ENCOUNTER — ROUTINE PRENATAL (OUTPATIENT)
Dept: OBGYN CLINIC | Facility: CLINIC | Age: 30
End: 2025-06-19
Payer: COMMERCIAL

## 2025-06-19 VITALS
BODY MASS INDEX: 25.76 KG/M2 | DIASTOLIC BLOOD PRESSURE: 72 MMHG | HEIGHT: 62 IN | SYSTOLIC BLOOD PRESSURE: 110 MMHG | WEIGHT: 140 LBS

## 2025-06-19 DIAGNOSIS — Z3A.28 28 WEEKS GESTATION OF PREGNANCY: Primary | ICD-10-CM

## 2025-06-19 DIAGNOSIS — E05.90 SUBCLINICAL HYPERTHYROIDISM: ICD-10-CM

## 2025-06-19 DIAGNOSIS — Z23 ENCOUNTER FOR IMMUNIZATION: ICD-10-CM

## 2025-06-19 PROCEDURE — 90471 IMMUNIZATION ADMIN: CPT | Performed by: OBSTETRICS & GYNECOLOGY

## 2025-06-19 PROCEDURE — 90715 TDAP VACCINE 7 YRS/> IM: CPT | Performed by: OBSTETRICS & GYNECOLOGY

## 2025-06-19 PROCEDURE — 99213 OFFICE O/P EST LOW 20 MIN: CPT | Performed by: OBSTETRICS & GYNECOLOGY

## 2025-06-23 DIAGNOSIS — O21.0 HYPEREMESIS GRAVIDARUM: ICD-10-CM

## 2025-06-23 RX ORDER — PYRIDOXINE HCL (VITAMIN B6) 25 MG
25 TABLET ORAL
Qty: 30 TABLET | Refills: 0 | Status: SHIPPED | OUTPATIENT
Start: 2025-06-23 | End: 2025-07-23

## 2025-06-24 ENCOUNTER — ULTRASOUND (OUTPATIENT)
Dept: PERINATAL CARE | Facility: OTHER | Age: 30
End: 2025-06-24
Payer: COMMERCIAL

## 2025-06-24 ENCOUNTER — ANCILLARY PROCEDURE (OUTPATIENT)
Dept: PERINATAL CARE | Facility: OTHER | Age: 30
End: 2025-06-24
Attending: ADVANCED PRACTICE MIDWIFE
Payer: COMMERCIAL

## 2025-06-24 VITALS
HEIGHT: 62 IN | WEIGHT: 141 LBS | HEART RATE: 112 BPM | SYSTOLIC BLOOD PRESSURE: 100 MMHG | DIASTOLIC BLOOD PRESSURE: 70 MMHG | BODY MASS INDEX: 25.95 KG/M2

## 2025-06-24 DIAGNOSIS — Z36.2 ENCOUNTER FOR FOLLOW-UP ULTRASOUND OF FETAL ANATOMY: ICD-10-CM

## 2025-06-24 DIAGNOSIS — Z3A.28 28 WEEKS GESTATION OF PREGNANCY: Primary | ICD-10-CM

## 2025-06-24 DIAGNOSIS — Z36.89 ENCOUNTER FOR ULTRASOUND TO ASSESS FETAL GROWTH: ICD-10-CM

## 2025-06-24 DIAGNOSIS — Z3A.28 28 WEEKS GESTATION OF PREGNANCY: ICD-10-CM

## 2025-06-24 PROCEDURE — 99212 OFFICE O/P EST SF 10 MIN: CPT | Performed by: OBSTETRICS & GYNECOLOGY

## 2025-06-24 PROCEDURE — 76816 OB US FOLLOW-UP PER FETUS: CPT | Performed by: OBSTETRICS & GYNECOLOGY

## 2025-06-24 NOTE — LETTER
June 25, 2025     Martha Horton CNM  [address]  [address]  [facility] [address]    Patient: Hayley Brown   YOB: 1995   Date of Visit: 6/24/2025       Dear Dr. Martha Horton CNM:    Thank you for referring Hayley Brown to me for evaluation. Below are my notes for this consultation.    If you have questions, please do not hesitate to call me. I look forward to following your patient along with you.         Sincerely,        Rosy Mckoy MD        CC: No Recipients    Rosy Mckoy MD  6/24/2025  3:39 PM  Sign when Signing Visit  Hayley Brown has no complaints today.  She reports regular fetal movements and does not report any problems.  She is here today at 28w4d for an ultrasound for fetal growth and review of the cavum which was limited on her prior fetal anatomy scan.    Problem list:  Prior small for gestational age    Ultrasound findings:  The ultrasound today shows normal interval fetal growth and fluid.  Views of the cavum appear normal today. This completes the anatomical survey.    Pregnancy ultrasound has limitations and is unable to detect all forms of fetal congenital abnormalities.  The inaccuracy in the EFW can be off by 1 lb either way in the third trimester.    Follow up recommended:   Recommend 1 last growth scan in 6 weeks.    Pre visit time reviewing her records   5 minutes  Face to face time 5 minutes  Post visit time on documentation of note, updating her problem list, adding orders and prescriptions 5 minutes.  Procedures that were completed today were charged separately.   The level of decision making was straight forward.    Rosy Mckoy MD

## 2025-06-24 NOTE — LETTER
June 25, 2025     WOOD Donovan  [address]   [address]  [facility] [address]    Patient: Hayley Brown   YOB: 1995   Date of Visit: 6/24/2025       Dear WOOD Marsh:    Thank you for referring Hayley Brown to me for evaluation. Below are my notes for this consultation.    If you have questions, please do not hesitate to call me. I look forward to following your patient along with you.         Sincerely,        Rosy Mckoy MD        CC: No Recipients    Rosy Mckoy MD  6/24/2025  3:39 PM  Sign when Signing Visit  Hayley Brown has no complaints today.  She reports regular fetal movements and does not report any problems.  She is here today at 28w4d for an ultrasound for fetal growth and review of the cavum which was limited on her prior fetal anatomy scan.    Problem list:  Prior small for gestational age    Ultrasound findings:  The ultrasound today shows normal interval fetal growth and fluid.  Views of the cavum appear normal today. This completes the anatomical survey.    Pregnancy ultrasound has limitations and is unable to detect all forms of fetal congenital abnormalities.  The inaccuracy in the EFW can be off by 1 lb either way in the third trimester.    Follow up recommended:   Recommend 1 last growth scan in 6 weeks.    Pre visit time reviewing her records   5 minutes  Face to face time 5 minutes  Post visit time on documentation of note, updating her problem list, adding orders and prescriptions 5 minutes.  Procedures that were completed today were charged separately.   The level of decision making was straight forward.    Rosy Mckoy MD

## 2025-06-24 NOTE — PROGRESS NOTES
Hayley Brown has no complaints today.  She reports regular fetal movements and does not report any problems.  She is here today at 28w4d for an ultrasound for fetal growth and review of the cavum which was limited on her prior fetal anatomy scan.    Problem list:  Prior small for gestational age    Ultrasound findings:  The ultrasound today shows normal interval fetal growth and fluid.  Views of the cavum appear normal today. This completes the anatomical survey.    Pregnancy ultrasound has limitations and is unable to detect all forms of fetal congenital abnormalities.  The inaccuracy in the EFW can be off by 1 lb either way in the third trimester.    Follow up recommended:   Recommend 1 last growth scan in 6 weeks.    Pre visit time reviewing her records   5 minutes  Face to face time 5 minutes  Post visit time on documentation of note, updating her problem list, adding orders and prescriptions 5 minutes.  Procedures that were completed today were charged separately.   The level of decision making was straight forward.    Rosy Mckoy MD

## 2025-07-07 PROBLEM — O21.0 HYPEREMESIS AFFECTING PREGNANCY, ANTEPARTUM: Status: RESOLVED | Noted: 2025-03-24 | Resolved: 2025-07-07

## 2025-07-07 PROBLEM — Z3A.30 30 WEEKS GESTATION OF PREGNANCY: Status: ACTIVE | Noted: 2025-03-17

## 2025-07-07 PROBLEM — K85.90 ACUTE PANCREATITIS: Status: RESOLVED | Noted: 2022-03-29 | Resolved: 2025-07-07

## 2025-07-07 PROBLEM — N93.0 POSTCOITAL BLEEDING: Status: RESOLVED | Noted: 2025-05-13 | Resolved: 2025-07-07

## 2025-07-07 NOTE — ASSESSMENT & PLAN NOTE
Continue prenatal vitamin daily  Continue fetal kick counts daily  Encouraged to complete 28-week labs  Breast pump ordered   Common discomforts of pregnancy and precautions including  labor reviewed.  Signs and symptoms to report reviewed

## 2025-07-07 NOTE — PROGRESS NOTES
Name: Hayley Brown      : 1995      MRN: [id number]  Encounter Provider: WOOD Donovan  Encounter Date: 2025   Encounter department: [facility]  :  Assessment & Plan  30 weeks gestation of pregnancy  Continue prenatal vitamin daily  Continue fetal kick counts daily  Encouraged to complete 28-week labs  Breast pump ordered   Common discomforts of pregnancy and precautions including  labor reviewed.  Signs and symptoms to report reviewed         Prenatal care, subsequent pregnancy in third trimester         Subclinical hyperthyroidism  Encouraged to complete TSH and free T4         RTO 2 weeks f/u labs     History of Present Illness   HPI  Hayley Brown is a 30 y.o. female who presents for PNV      Denies loss of fluid, vaginal bleeding and abdominal pain.  Had spotting after intercourse last Wednesday.  Confirms frequent fetal movement.  Doing fetal kick counts.  Tolerating prenatal vitamin well.  Has not completed 28-week labs. Planning to go this week     History obtained from: patient    Review of Systems   Constitutional:  Negative for chills and fever.   Respiratory: Negative.     Cardiovascular: Negative.    Genitourinary: Negative.      Medical History Reviewed by provider this encounter:  Allergies  Meds     .     Objective   /64   Wt 65.6 kg (144 lb 9.6 oz)   LMP  (LMP Unknown)   BMI 26.45 kg/m²      Physical Exam  Vitals reviewed.   Constitutional:       Appearance: Normal appearance.   Pulmonary:      Effort: Pulmonary effort is normal.   Abdominal:      Palpations: Abdomen is soft.      Tenderness: There is no abdominal tenderness.      Comments: Gravid     Neurological:      Mental Status: She is alert and oriented to person, place, and time.     Psychiatric:         Mood and Affect: Mood normal.         Behavior: Behavior normal.

## 2025-07-08 ENCOUNTER — ROUTINE PRENATAL (OUTPATIENT)
Dept: OBGYN CLINIC | Facility: CLINIC | Age: 30
End: 2025-07-08
Payer: COMMERCIAL

## 2025-07-08 ENCOUNTER — PATIENT MESSAGE (OUTPATIENT)
Dept: OBGYN CLINIC | Facility: CLINIC | Age: 30
End: 2025-07-08

## 2025-07-08 VITALS — BODY MASS INDEX: 26.45 KG/M2 | DIASTOLIC BLOOD PRESSURE: 64 MMHG | SYSTOLIC BLOOD PRESSURE: 114 MMHG | WEIGHT: 144.6 LBS

## 2025-07-08 DIAGNOSIS — Z34.83 PRENATAL CARE, SUBSEQUENT PREGNANCY IN THIRD TRIMESTER: Primary | ICD-10-CM

## 2025-07-08 DIAGNOSIS — Z3A.30 30 WEEKS GESTATION OF PREGNANCY: ICD-10-CM

## 2025-07-08 DIAGNOSIS — E05.90 SUBCLINICAL HYPERTHYROIDISM: ICD-10-CM

## 2025-07-08 LAB
DME PARACHUTE DELIVERY DATE REQUESTED: NORMAL
DME PARACHUTE ITEM DESCRIPTION: NORMAL
DME PARACHUTE ORDER STATUS: NORMAL
DME PARACHUTE SUPPLIER NAME: NORMAL
DME PARACHUTE SUPPLIER PHONE: NORMAL

## 2025-07-08 PROCEDURE — 99213 OFFICE O/P EST LOW 20 MIN: CPT | Performed by: NURSE PRACTITIONER

## 2025-07-10 ENCOUNTER — PATIENT MESSAGE (OUTPATIENT)
Dept: OBGYN CLINIC | Facility: CLINIC | Age: 30
End: 2025-07-10

## 2025-07-10 ENCOUNTER — HOSPITAL ENCOUNTER (EMERGENCY)
Facility: HOSPITAL | Age: 30
Discharge: HOME | End: 2025-07-11
Attending: EMERGENCY MEDICINE
Payer: COMMERCIAL

## 2025-07-10 ENCOUNTER — APPOINTMENT (EMERGENCY)
Dept: ULTRASOUND IMAGING | Facility: HOSPITAL | Age: 30
End: 2025-07-10
Payer: COMMERCIAL

## 2025-07-10 DIAGNOSIS — N39.0 UTI (URINARY TRACT INFECTION): ICD-10-CM

## 2025-07-10 DIAGNOSIS — N93.9 VAGINAL BLEEDING: ICD-10-CM

## 2025-07-10 DIAGNOSIS — R10.9 ABDOMINAL PAIN IN PREGNANCY: Primary | ICD-10-CM

## 2025-07-10 DIAGNOSIS — O26.899 ABDOMINAL PAIN IN PREGNANCY: Primary | ICD-10-CM

## 2025-07-10 LAB
ALBUMIN SERPL BCG-MCNC: 3.3 G/DL (ref 3.5–5)
ALP SERPL-CCNC: 85 U/L (ref 34–104)
ALT SERPL W P-5'-P-CCNC: 8 U/L (ref 7–52)
ANION GAP SERPL CALCULATED.3IONS-SCNC: 10 MMOL/L (ref 4–13)
AST SERPL W P-5'-P-CCNC: 14 U/L (ref 13–39)
BACTERIA UR QL AUTO: ABNORMAL /HPF
BASOPHILS # BLD AUTO: 0.04 THOUSANDS/ÂΜL (ref 0–0.1)
BASOPHILS NFR BLD AUTO: 0 % (ref 0–1)
BILIRUB SERPL-MCNC: 0.36 MG/DL (ref 0.2–1)
BILIRUB UR QL STRIP: NEGATIVE
BUN SERPL-MCNC: 7 MG/DL (ref 5–25)
CALCIUM ALBUM COR SERPL-MCNC: 9.3 MG/DL (ref 8.3–10.1)
CALCIUM SERPL-MCNC: 8.7 MG/DL (ref 8.4–10.2)
CHLORIDE SERPL-SCNC: 104 MMOL/L (ref 96–108)
CLARITY UR: ABNORMAL
CO2 SERPL-SCNC: 21 MMOL/L (ref 21–32)
COLOR UR: YELLOW
CREAT SERPL-MCNC: 0.48 MG/DL (ref 0.6–1.3)
EOSINOPHIL # BLD AUTO: 1.02 THOUSAND/ÂΜL (ref 0–0.61)
EOSINOPHIL NFR BLD AUTO: 7 % (ref 0–6)
ERYTHROCYTE [DISTWIDTH] IN BLOOD BY AUTOMATED COUNT: 13.1 % (ref 11.6–15.1)
GFR SERPL CREATININE-BSD FRML MDRD: 132 ML/MIN/1.73SQ M
GLUCOSE SERPL-MCNC: 142 MG/DL (ref 65–140)
GLUCOSE UR STRIP-MCNC: ABNORMAL MG/DL
HCT VFR BLD AUTO: 30.8 % (ref 34.8–46.1)
HGB BLD-MCNC: 10.4 G/DL (ref 11.5–15.4)
HGB UR QL STRIP.AUTO: ABNORMAL
IMM GRANULOCYTES # BLD AUTO: 0.25 THOUSAND/UL (ref 0–0.2)
IMM GRANULOCYTES NFR BLD AUTO: 2 % (ref 0–2)
KETONES UR STRIP-MCNC: ABNORMAL MG/DL
LEUKOCYTE ESTERASE UR QL STRIP: NEGATIVE
LIPASE SERPL-CCNC: 38 U/L (ref 11–82)
LYMPHOCYTES # BLD AUTO: 2.89 THOUSANDS/ÂΜL (ref 0.6–4.47)
LYMPHOCYTES NFR BLD AUTO: 19 % (ref 14–44)
MCH RBC QN AUTO: 30.2 PG (ref 26.8–34.3)
MCHC RBC AUTO-ENTMCNC: 33.8 G/DL (ref 31.4–37.4)
MCV RBC AUTO: 90 FL (ref 82–98)
MONOCYTES # BLD AUTO: 0.86 THOUSAND/ÂΜL (ref 0.17–1.22)
MONOCYTES NFR BLD AUTO: 6 % (ref 4–12)
MUCOUS THREADS UR QL AUTO: ABNORMAL
NEUTROPHILS # BLD AUTO: 10.22 THOUSANDS/ÂΜL (ref 1.85–7.62)
NEUTS SEG NFR BLD AUTO: 66 % (ref 43–75)
NITRITE UR QL STRIP: NEGATIVE
NON-SQ EPI CELLS URNS QL MICRO: ABNORMAL /HPF
NRBC BLD AUTO-RTO: 0 /100 WBCS
PH UR STRIP.AUTO: 6 [PH]
PLATELET # BLD AUTO: 190 THOUSANDS/UL (ref 149–390)
PMV BLD AUTO: 11.6 FL (ref 8.9–12.7)
POTASSIUM SERPL-SCNC: 3.5 MMOL/L (ref 3.5–5.3)
PROT SERPL-MCNC: 6 G/DL (ref 6.4–8.4)
PROT UR STRIP-MCNC: ABNORMAL MG/DL
RBC # BLD AUTO: 3.44 MILLION/UL (ref 3.81–5.12)
RBC #/AREA URNS AUTO: ABNORMAL /HPF
SODIUM SERPL-SCNC: 135 MMOL/L (ref 135–147)
SP GR UR STRIP.AUTO: >=1.03 (ref 1–1.03)
UROBILINOGEN UR STRIP-ACNC: <2 MG/DL
WBC # BLD AUTO: 15.28 THOUSAND/UL (ref 4.31–10.16)
WBC #/AREA URNS AUTO: ABNORMAL /HPF

## 2025-07-10 PROCEDURE — 99284 EMERGENCY DEPT VISIT MOD MDM: CPT

## 2025-07-10 PROCEDURE — 85025 COMPLETE CBC W/AUTO DIFF WBC: CPT

## 2025-07-10 PROCEDURE — 99285 EMERGENCY DEPT VISIT HI MDM: CPT | Performed by: EMERGENCY MEDICINE

## 2025-07-10 PROCEDURE — 83690 ASSAY OF LIPASE: CPT

## 2025-07-10 PROCEDURE — 81001 URINALYSIS AUTO W/SCOPE: CPT

## 2025-07-10 PROCEDURE — 76775 US EXAM ABDO BACK WALL LIM: CPT

## 2025-07-10 PROCEDURE — 80053 COMPREHEN METABOLIC PANEL: CPT

## 2025-07-10 PROCEDURE — 87086 URINE CULTURE/COLONY COUNT: CPT

## 2025-07-10 PROCEDURE — 36415 COLL VENOUS BLD VENIPUNCTURE: CPT

## 2025-07-10 NOTE — Clinical Note
Date: 7/11/2025  Patient: Hayley Brown  Admitted: 7/10/2025  9:08 PM  Attending Provider: Alberto Myers    Hayley Borwn or her authorized caregiver has made the decision for the patient to leave the emergency department agains t the advice of her attending physician. She or her authorized caregiver has been informed and understands the inherent risks, including death, death of baby, .  She or her authorized caregiver has decided to accept the responsibility for this decisi on. Hayley Brown and all necessary parties have been advised that she may return for further evaluation or treatment. Her condition at time of discharge stable.  Hayley Brown had current vital signs as follows:  /60 (BP Location: Righ t arm)   Pulse 99   Temp 97.8 °F (36.6 °C) (Temporal)   Resp 18   Wt 65.8 kg (145 lb)   LMP  (LMP Unknown)

## 2025-07-10 NOTE — PATIENT COMMUNICATION
Subject heading is incorrect.  Should read third trimester check-in.  Was not able to be corrected by IT due to being read by patient.  Called patient for third trimester check in call.  Left message on voicemail.  Will send follow up [email or web address] message.

## 2025-07-11 VITALS
OXYGEN SATURATION: 97 % | TEMPERATURE: 97.8 F | WEIGHT: 145 LBS | SYSTOLIC BLOOD PRESSURE: 103 MMHG | RESPIRATION RATE: 18 BRPM | DIASTOLIC BLOOD PRESSURE: 59 MMHG | BODY MASS INDEX: 26.52 KG/M2 | HEART RATE: 104 BPM

## 2025-07-11 RX ORDER — CEFPODOXIME PROXETIL 200 MG/1
200 TABLET, FILM COATED ORAL ONCE
Status: COMPLETED | OUTPATIENT
Start: 2025-07-11 | End: 2025-07-11

## 2025-07-11 RX ORDER — CEFPODOXIME PROXETIL 200 MG/1
200 TABLET, FILM COATED ORAL 2 TIMES DAILY
Qty: 28 TABLET | Refills: 0 | Status: SHIPPED | OUTPATIENT
Start: 2025-07-11 | End: 2025-07-25

## 2025-07-11 RX ADMIN — CEFPODOXIME PROXETIL 200 MG: 200 TABLET, FILM COATED ORAL at 01:29

## 2025-07-11 NOTE — ED PROVIDER NOTES
Time reflects when diagnosis was documented in both MDM as applicable and the Disposition within this note       Time User Action Codes Description Comment    7/11/2025  1:02 AM Alberto Myers [O26.899,  R10.9] Abdominal pain in pregnancy     7/11/2025  1:02 AM Alberto Myers [N93.9] Vaginal bleeding     7/11/2025  1:25 AM Alberto Myers [N39.0] UTI (urinary tract infection)           ED Disposition       ED Disposition   Discharge    Condition   Stable    Date/Time   Fri Jul 11, 2025  1:24 AM    Comment   --             Assessment & Plan       Medical Decision Making  I reviewed the patient's medical chart, PMHx, prior encounters, medications.    My DDx includes: UTI, pyelonephritis, kidney stone, contractions, labor, Nura Adrian contractions    Will order GI labs including CMP to evaluate electrolytes, kidney function, CBC to evaluate blood counts.,  Lipase to evaluate for evidence of pancreatitis.  Urinalysis evaluate for UTI.    Fetal heart tones 153    Patient's story is rather nonspecific, does not appear consistent with true labor.  Pelvic exam was performed that showed no effacement or cervical dilation, further reassuring that concern.  Patient only had 3 episodes here since arrival.  Patient describes pain that was mostly on the left back radiating to the left lower quadrant of the abdomen, which may me question if this was a kidney stone.  Patient's renal ultrasound demonstrated nephrolithiasis without hydronephrosis, it is possible that she just passed a stone.  She did have a urinalysis that showed hematuria, was not consistent with infection, however she has had hematuria on prior UAs as well.  Does have a leukocytosis of 15 however this is rather nonspecific.  I discussed the case with OB/GYN who reviewed labs, imaging, and ultimately at this time was comfortable with plan for discharge.  Patient was very well-appearing on reassessment without any significant pain, she  is afebrile throughout the encounter.  Out of abundance of caution, patient was started on antibiotics for UTI, pyelonephritis.  Recommended close follow-up with OB/GYN which she is agreeable with.  Strict return precautions were given, she was discharged.    Amount and/or Complexity of Data Reviewed  Labs:  Decision-making details documented in ED Course.  Radiology: ordered.    Risk  Prescription drug management.        ED Course as of 07/11/25 0354   Fri Jul 11, 2025   0106 Patient asking to sign out AMA, I am discussing plan with her OB.   0107 RBC Urine(!): Innumerable  Hematuria present, appears to be present on UA done 3 months ago as well. Negative US for hydronephrosis (less consistent with obstructing stone)       Medications   cefpodoxime (VANTIN) tablet 200 mg (200 mg Oral Given 7/11/25 0129)       ED Risk Strat Scores                    No data recorded        SBIRT 22yo+      Flowsheet Row Most Recent Value   Initial Alcohol Screen: US AUDIT-C     1. How often do you have a drink containing alcohol? 0 Filed at: 07/10/2025 2144   2. How many drinks containing alcohol do you have on a typical day you are drinking?  0 Filed at: 07/10/2025 2144   3b. FEMALE Any Age, or MALE 65+: How often do you have 4 or more drinks on one occassion? 0 Filed at: 07/10/2025 2144   Audit-C Score 0 Filed at: 07/10/2025 2144   ALEX: How many times in the past year have you...    Used an illegal drug or used a prescription medication for non-medical reasons? Never Filed at: 07/10/2025 2144                            History of Present Illness       Chief Complaint   Patient presents with    Abdominal Pain Pregnant     Pt states she is 7 month alone having pain in her back to front that she thinks is contractions this is #5 preg       Past Medical History[1]   Past Surgical History[2]   Family History[3]   Social History[4]   E-Cigarette/Vaping    E-Cigarette Use Never User       E-Cigarette/Vaping Substances    Nicotine No      THC No     CBD No     Flavoring No     Other No     Unknown No       I have reviewed and agree with the history as documented.     30-year-old female, , 30 weeks and 6 days pregnant, who presents for left-sided flank pain rating to the lower abdomen, worse in the left side.  She denies any vaginal bleeding or discharge.  Pain is improved currently, was worse at home.  She reports since being here has had 3 exacerbations of the pain.  Denies any nausea or vomiting.  She does report urinary frequency however denies any dysuria.  No fevers or chills.  ROS otherwise negative.        Review of Systems   Constitutional:  Negative for chills and fever.   HENT:  Negative for congestion, rhinorrhea and sore throat.    Respiratory:  Negative for cough and shortness of breath.    Cardiovascular:  Negative for chest pain and palpitations.   Gastrointestinal:  Positive for abdominal pain. Negative for constipation, diarrhea, nausea and vomiting.   Genitourinary:  Positive for flank pain. Negative for difficulty urinating.   Musculoskeletal:  Negative for arthralgias.   Neurological:  Negative for dizziness, weakness, light-headedness and headaches.   Psychiatric/Behavioral:  Negative for agitation, behavioral problems and confusion.    All other systems reviewed and are negative.          Objective       ED Triage Vitals [07/10/25 2114]   Temperature Pulse Blood Pressure Respirations SpO2 Patient Position - Orthostatic VS   97.8 °F (36.6 °C) (!) 125 123/70 20 98 % Lying      Temp Source Heart Rate Source BP Location FiO2 (%) Pain Score    Temporal Monitor Right arm -- 7      Vitals      Date and Time Temp Pulse SpO2 Resp BP Pain Score FACES Pain Rating User   25 0100 97.8 °F (36.6 °C) 104 97 % 18 103/59 5 -- BB   07/10/25 2330 -- 99 98 % 18 112/60 5 -- BB   07/10/25 2300 -- 107 97 % 18 110/72 -- -- CD   07/10/25 2230 -- 111 97 % 20 115/67 -- -- CD   07/10/25 2114 97.8 °F (36.6 °C) 125 98 % 20 123/70 7 -- P             Physical Exam  Constitutional:       Appearance: She is well-developed.   HENT:      Head: Normocephalic and atraumatic.     Cardiovascular:      Rate and Rhythm: Normal rate and regular rhythm.      Heart sounds: Normal heart sounds. No murmur heard.     No friction rub.   Pulmonary:      Effort: Pulmonary effort is normal. No respiratory distress.      Breath sounds: Normal breath sounds. No wheezing or rales.   Abdominal:      General: Bowel sounds are normal. There is no distension.      Palpations: Abdomen is soft.      Tenderness: There is abdominal tenderness. There is left CVA tenderness.      Comments: Left CVA tenderness, left lower abdominal tenderness   Genitourinary:     Comments: Patient is minimally dilated, 1 cm, no effacement, no vaginal bleeding or discharge    Musculoskeletal:         General: Normal range of motion.      Cervical back: Normal range of motion and neck supple.     Skin:     General: Skin is warm.     Neurological:      Mental Status: She is alert and oriented to person, place, and time.      Coordination: Coordination normal.     Psychiatric:         Behavior: Behavior normal.         Thought Content: Thought content normal.         Judgment: Judgment normal.         Results Reviewed       Procedure Component Value Units Date/Time    Comprehensive metabolic panel [[id number]]  (Abnormal) Collected: 07/10/25 2155    Lab Status: Final result Specimen: Blood from Arm, Left Updated: 07/10/25 2216     Sodium 135 mmol/L      Potassium 3.5 mmol/L      Chloride 104 mmol/L      CO2 21 mmol/L      ANION GAP 10 mmol/L      BUN 7 mg/dL      Creatinine 0.48 mg/dL      Glucose 142 mg/dL      Calcium 8.7 mg/dL      Corrected Calcium 9.3 mg/dL      AST 14 U/L      ALT 8 U/L      Alkaline Phosphatase 85 U/L      Total Protein 6.0 g/dL      Albumin 3.3 g/dL      Total Bilirubin 0.36 mg/dL      eGFR 132 ml/min/1.73sq m     Narrative:      National Kid[employer] guidelines for Chronic  Kidney Disease (CKD):     Stage 1 with normal or high GFR (GFR > 90 mL/min/1.73 square meters)    Stage 2 Mild CKD (GFR = 60-89 mL/min/1.73 square meters)    Stage 3A Moderate CKD (GFR = 45-59 mL/min/1.73 square meters)    Stage 3B Moderate CKD (GFR = 30-44 mL/min/1.73 square meters)    Stage 4 Severe CKD (GFR = 15-29 mL/min/1.73 square meters)    Stage 5 End Stage CKD (GFR <15 mL/min/1.73 square meters)  Note: GFR calculation is accurate only with a steady state creatinine    Lipase [[id number]]  (Normal) Collected: 07/10/25 2155    Lab Status: Final result Specimen: Blood from Arm, Left Updated: 07/10/25 2216     Lipase 38 u/L     CBC and differential [[id number]]  (Abnormal) Collected: 07/10/25 2155    Lab Status: Final result Specimen: Blood from Arm, Left Updated: 07/10/25 2202     WBC 15.28 Thousand/uL      RBC 3.44 Million/uL      Hemoglobin 10.4 g/dL      Hematocrit 30.8 %      MCV 90 fL      MCH 30.2 pg      MCHC 33.8 g/dL      RDW 13.1 %      MPV 11.6 fL      Platelets 190 Thousands/uL      nRBC 0 /100 WBCs      Segmented % 66 %      Immature Grans % 2 %      Lymphocytes % 19 %      Monocytes % 6 %      Eosinophils Relative 7 %      Basophils Relative 0 %      Absolute Neutrophils 10.22 Thousands/µL      Absolute Immature Grans 0.25 Thousand/uL      Absolute Lymphocytes 2.89 Thousands/µL      Absolute Monocytes 0.86 Thousand/µL      Eosinophils Absolute 1.02 Thousand/µL      Basophils Absolute 0.04 Thousands/µL     Urine Microscopic [[id number]]  (Abnormal) Collected: 07/10/25 2124    Lab Status: Final result Specimen: Urine, Clean Catch Updated: 07/10/25 2139     RBC, UA Innumerable /hpf      WBC, UA 0-1 /hpf      Epithelial Cells Moderate /hpf      Bacteria, UA Occasional /hpf      MUCUS THREADS Moderate     URINE COMMENT --    UA w Reflex to Microscopic w Reflex to Culture [[id number]]  (Abnormal) Collected: 07/10/25 2124    Lab Status: Final result Specimen: Urine, Clean Catch Updated: 07/10/25 2132      Color, UA Yellow     Clarity, UA Slightly Cloudy     Specific Gravity, UA >=1.030     pH, UA 6.0     Leukocytes, UA Negative     Nitrite, UA Negative     Protein, UA Trace mg/dl      Glucose,  (3/10%) mg/dl      Ketones, UA 80 (3+) mg/dl      Urobilinogen, UA <2.0 mg/dl      Bilirubin, UA Negative     Occult Blood, UA Moderate     URINE COMMENT --    Urine culture [[id number]] Collected: 07/10/25 2124    Lab Status: In process Specimen: Urine, Clean Catch Updated: 07/10/25 2132             kidney and bladder   Final Interpretation by Power Barton DO (07/11 0023)      Left-sided nephrolithiasis, no hydronephrosis.      Partially distended bladder, gravid uterus partially imaged, and other findings as above.            Workstation performed: [id number]             Procedures    ED Medication and Procedure Management   Prior to Admission Medications   Prescriptions Last Dose Informant Patient Reported? Taking?   Prenatal Vit-Fe Fumarate-FA (PRENATAL PO)   Yes No   Sig: Take by mouth   doxylamine (UNISOM) 25 MG tablet  Self No No   Sig: Take 1 tablet (25 mg total) by mouth daily at bedtime as needed for sleep   pyridoxine (B-6) 25 MG tablet   No No   Sig: Take 1 tablet (25 mg total) by mouth daily at bedtime      Facility-Administered Medications: None     Discharge Medication List as of 7/11/2025  1:26 AM        START taking these medications    Details   cefpodoxime (VANTIN) 200 mg tablet Take 1 tablet (200 mg total) by mouth 2 (two) times a day for 14 days, Starting Fri 7/11/2025, Until Fri 7/25/2025, Normal           CONTINUE these medications which have NOT CHANGED    Details   doxylamine (UNISOM) 25 MG tablet Take 1 tablet (25 mg total) by mouth daily at bedtime as needed for sleep, Starting Tue 3/25/2025, Normal      Prenatal Vit-Fe Fumarate-FA (PRENATAL PO) Take by mouth, Historical Med      pyridoxine (B-6) 25 MG tablet Take 1 tablet (25 mg total) by mouth daily at bedtime, Starting Mon  6/23/2025, Until Wed 7/23/2025, Normal           No discharge procedures on file.  ED SEPSIS DOCUMENTATION   Time reflects when diagnosis was documented in both MDM as applicable and the Disposition within this note       Time User Action Codes Description Comment    7/11/2025  1:02 AM Alberto Myers [O26.899,  R10.9] Abdominal pain in pregnancy     7/11/2025  1:02 AM Alberto Myers [N93.9] Vaginal bleeding     7/11/2025  1:25 AM Alberto Myers [N39.0] UTI (urinary tract infection)                    [1]   Past Medical History:  Diagnosis Date    GERD (gastroesophageal reflux disease)     Subclinical hyperthyroidism     Varicella    [2] No past surgical history on file.  [3]   Family History  Problem Relation Name Age of Onset    Stroke Mother Rhonda bird     Vision loss Mother Rhonda bird     Diabetes Father Vasiliy zoey     Alcohol abuse Father Vasiliylinda greer     Intellectual disability Sister      Hypertension Brother      Diabetes Maternal Grandmother Medina guzman     Alzheimer's disease Maternal Grandmother Medina guzman     Schizophrenia Cousin Jimbomitch robertñez    [4]   Social History  Tobacco Use    Smoking status: Never     Passive exposure: Past    Smokeless tobacco: Never   Vaping Use    Vaping status: Never Used   Substance Use Topics    Alcohol use: Not Currently     Comment: socially    Drug use: Never        Alberto Myers MD  07/11/25 0354

## 2025-07-11 NOTE — DISCHARGE INSTRUCTIONS
Please follow up closely with OB/GYN. Call in morning.    Your urine is not particularly impressive for UTI, however, given your description of symptoms, elevated white count, will treat empirically with antibiotic course. Please follow up closely with OB for reassessment. Follow all return precautions.    Thank you.

## 2025-07-12 LAB — BACTERIA UR CULT: NORMAL

## 2025-07-21 ENCOUNTER — APPOINTMENT (OUTPATIENT)
Dept: LAB | Facility: CLINIC | Age: 30
End: 2025-07-21
Attending: ADVANCED PRACTICE MIDWIFE
Payer: COMMERCIAL

## 2025-07-24 DIAGNOSIS — O21.0 HYPEREMESIS GRAVIDARUM: ICD-10-CM

## 2025-07-25 ENCOUNTER — PATIENT MESSAGE (OUTPATIENT)
Dept: OBGYN CLINIC | Facility: CLINIC | Age: 30
End: 2025-07-25

## 2025-07-25 PROBLEM — N30.01 ACUTE CYSTITIS WITH HEMATURIA: Status: ACTIVE | Noted: 2025-07-25

## 2025-07-25 PROBLEM — N30.01 ACUTE CYSTITIS WITH HEMATURIA: Status: RESOLVED | Noted: 2025-07-25 | Resolved: 2025-07-25

## 2025-07-25 PROBLEM — Z3A.33 33 WEEKS GESTATION OF PREGNANCY: Status: ACTIVE | Noted: 2025-03-17

## 2025-07-25 PROBLEM — N39.0 UTI (URINARY TRACT INFECTION): Status: ACTIVE | Noted: 2025-07-25

## 2025-07-25 LAB
DME PARACHUTE DELIVERY DATE ACTUAL: NORMAL
DME PARACHUTE DELIVERY DATE REQUESTED: NORMAL
DME PARACHUTE ITEM DESCRIPTION: NORMAL
DME PARACHUTE ORDER STATUS: NORMAL
DME PARACHUTE SUPPLIER NAME: NORMAL
DME PARACHUTE SUPPLIER PHONE: NORMAL

## 2025-07-25 RX ORDER — PYRIDOXINE HCL (VITAMIN B6) 25 MG
25 TABLET ORAL
Qty: 30 TABLET | Refills: 0 | Status: SHIPPED | OUTPATIENT
Start: 2025-07-25 | End: 2025-08-24

## 2025-08-05 ENCOUNTER — TELEPHONE (OUTPATIENT)
Dept: OBGYN CLINIC | Facility: MEDICAL CENTER | Age: 30
End: 2025-08-05

## 2025-08-18 ENCOUNTER — ROUTINE PRENATAL (OUTPATIENT)
Dept: OBGYN CLINIC | Facility: CLINIC | Age: 30
End: 2025-08-18
Payer: COMMERCIAL

## 2025-08-18 VITALS
WEIGHT: 135 LBS | DIASTOLIC BLOOD PRESSURE: 72 MMHG | SYSTOLIC BLOOD PRESSURE: 120 MMHG | BODY MASS INDEX: 24.84 KG/M2 | HEIGHT: 62 IN

## 2025-08-18 DIAGNOSIS — O42.019 PRETERM PREMATURE RUPTURE OF MEMBRANES WITH ONSET OF LABOR WITHIN 24 HOURS OF RUPTURE: ICD-10-CM

## 2025-08-18 DIAGNOSIS — N89.8 VAGINAL ODOR: ICD-10-CM

## 2025-08-18 DIAGNOSIS — Z30.09 FAMILY PLANNING INITIATION: ICD-10-CM

## 2025-08-18 DIAGNOSIS — N89.8 VAGINAL DISCHARGE: ICD-10-CM

## 2025-08-18 RX ORDER — ACETAMINOPHEN AND CODEINE PHOSPHATE 120; 12 MG/5ML; MG/5ML
1 SOLUTION ORAL DAILY
Qty: 84 TABLET | Refills: 3 | Status: SHIPPED | OUTPATIENT
Start: 2025-08-18

## 2025-08-18 RX ORDER — SERTRALINE HYDROCHLORIDE 25 MG/1
25 TABLET, FILM COATED ORAL DAILY
Qty: 30 TABLET | Refills: 3 | Status: SHIPPED | OUTPATIENT
Start: 2025-08-18

## 2025-08-18 RX ORDER — FERROUS SULFATE 325(65) MG
TABLET ORAL
COMMUNITY
Start: 2025-07-26

## 2025-08-24 PROBLEM — N39.0 UTI (URINARY TRACT INFECTION): Status: RESOLVED | Noted: 2025-07-25 | Resolved: 2025-08-24
